# Patient Record
Sex: FEMALE | Race: ASIAN | NOT HISPANIC OR LATINO
[De-identification: names, ages, dates, MRNs, and addresses within clinical notes are randomized per-mention and may not be internally consistent; named-entity substitution may affect disease eponyms.]

---

## 2018-11-18 ENCOUNTER — FORM ENCOUNTER (OUTPATIENT)
Age: 58
End: 2018-11-18

## 2018-11-19 ENCOUNTER — APPOINTMENT (OUTPATIENT)
Dept: ORTHOPEDIC SURGERY | Facility: CLINIC | Age: 58
End: 2018-11-19
Payer: MEDICAID

## 2018-11-19 ENCOUNTER — OUTPATIENT (OUTPATIENT)
Dept: OUTPATIENT SERVICES | Facility: HOSPITAL | Age: 58
LOS: 1 days | End: 2018-11-19
Payer: COMMERCIAL

## 2018-11-19 PROCEDURE — 99213 OFFICE O/P EST LOW 20 MIN: CPT

## 2018-11-19 PROCEDURE — 73562 X-RAY EXAM OF KNEE 3: CPT | Mod: 26,50

## 2018-11-19 PROCEDURE — 73562 X-RAY EXAM OF KNEE 3: CPT

## 2018-12-11 ENCOUNTER — APPOINTMENT (OUTPATIENT)
Dept: ORTHOPEDIC SURGERY | Facility: CLINIC | Age: 58
End: 2018-12-11
Payer: MEDICAID

## 2018-12-11 PROCEDURE — 99213 OFFICE O/P EST LOW 20 MIN: CPT

## 2019-02-12 ENCOUNTER — APPOINTMENT (OUTPATIENT)
Dept: ORTHOPEDIC SURGERY | Facility: CLINIC | Age: 59
End: 2019-02-12
Payer: MEDICAID

## 2019-02-12 DIAGNOSIS — M17.12 UNILATERAL PRIMARY OSTEOARTHRITIS, LEFT KNEE: ICD-10-CM

## 2019-02-12 PROCEDURE — 99213 OFFICE O/P EST LOW 20 MIN: CPT

## 2019-02-12 NOTE — REASON FOR VISIT
[Follow-Up Visit] : a follow-up visit for [Artificial Knee Joint] : artificial knee joint [Knee Pain] : knee pain [Osteoarthritis, Knee] : osteoarthritis, knee

## 2019-03-01 NOTE — PHYSICAL EXAM
[de-identified] : Gen: NAD/AAOx3, cooperative\par HEENT: NC/AT\par CV: RRR\par Resp: nonlabored breathing, equal chest rise\par Abd: soft/nontender/nondistended\par R knee:\par - Incision well healed\par - ROM 0-100\par - Lig stable V/V/A/P\par - Motor 5/5\par - NTTP\par - NVID\par L knee:\par - ROM 0-110\par - Lig stable V/V/A/P\par - Motor 5/5\par - Mildly TTP medial joint line, otherwise NTTP\par - NVID

## 2019-03-01 NOTE — HISTORY OF PRESENT ILLNESS
[de-identified] : 58y/o female accompanied by son following up for R TKA and left knee OA. Last left knee CSI was Nov 2018. She is doing very well. No pain in either knee. Participating in PT twice weekly. Ambulating without assistive device. No complaints.

## 2019-03-01 NOTE — DISCUSSION/SUMMARY
[de-identified] : 60y/o female s/p R TKA, with stable left knee OA\par - Declined CSI today due to no pain\par - Cont PT\par - RTC yearly for new XR, or call back earlier for appointment if needed...

## 2020-12-05 ENCOUNTER — INPATIENT (INPATIENT)
Facility: HOSPITAL | Age: 60
LOS: 3 days | Discharge: HOME HEALTH | End: 2020-12-09
Attending: INTERNAL MEDICINE | Admitting: INTERNAL MEDICINE
Payer: MEDICAID

## 2020-12-05 VITALS
TEMPERATURE: 98 F | SYSTOLIC BLOOD PRESSURE: 156 MMHG | HEART RATE: 74 BPM | DIASTOLIC BLOOD PRESSURE: 75 MMHG | OXYGEN SATURATION: 100 % | RESPIRATION RATE: 16 BRPM

## 2020-12-05 DIAGNOSIS — R33.9 RETENTION OF URINE, UNSPECIFIED: ICD-10-CM

## 2020-12-05 LAB
A1C WITH ESTIMATED AVERAGE GLUCOSE RESULT: 10.8 % — HIGH (ref 4–5.6)
ALBUMIN SERPL ELPH-MCNC: 3.7 G/DL — SIGNIFICANT CHANGE UP (ref 3.3–5)
ALP SERPL-CCNC: 85 U/L — SIGNIFICANT CHANGE UP (ref 40–120)
ALT FLD-CCNC: 22 U/L — SIGNIFICANT CHANGE UP (ref 4–33)
ANION GAP SERPL CALC-SCNC: 14 MMOL/L — SIGNIFICANT CHANGE UP (ref 7–14)
APPEARANCE UR: CLEAR — SIGNIFICANT CHANGE UP
AST SERPL-CCNC: 25 U/L — SIGNIFICANT CHANGE UP (ref 4–32)
B-OH-BUTYR SERPL-SCNC: <0 MMOL/L — SIGNIFICANT CHANGE UP (ref 0–0.4)
BASOPHILS # BLD AUTO: 0.04 K/UL — SIGNIFICANT CHANGE UP (ref 0–0.2)
BASOPHILS NFR BLD AUTO: 0.3 % — SIGNIFICANT CHANGE UP (ref 0–2)
BILIRUB SERPL-MCNC: 0.4 MG/DL — SIGNIFICANT CHANGE UP (ref 0.2–1.2)
BILIRUB UR-MCNC: NEGATIVE — SIGNIFICANT CHANGE UP
BLOOD GAS VENOUS COMPREHENSIVE RESULT: SIGNIFICANT CHANGE UP
BUN SERPL-MCNC: 16 MG/DL — SIGNIFICANT CHANGE UP (ref 7–23)
CALCIUM SERPL-MCNC: 9.8 MG/DL — SIGNIFICANT CHANGE UP (ref 8.4–10.5)
CHLORIDE SERPL-SCNC: 96 MMOL/L — LOW (ref 98–107)
CO2 SERPL-SCNC: 24 MMOL/L — SIGNIFICANT CHANGE UP (ref 22–31)
COLOR SPEC: SIGNIFICANT CHANGE UP
CREAT SERPL-MCNC: 0.79 MG/DL — SIGNIFICANT CHANGE UP (ref 0.5–1.3)
DIFF PNL FLD: NEGATIVE — SIGNIFICANT CHANGE UP
EOSINOPHIL # BLD AUTO: 0.09 K/UL — SIGNIFICANT CHANGE UP (ref 0–0.5)
EOSINOPHIL NFR BLD AUTO: 0.6 % — SIGNIFICANT CHANGE UP (ref 0–6)
ESTIMATED AVERAGE GLUCOSE: 263 — SIGNIFICANT CHANGE UP
GLUCOSE SERPL-MCNC: 281 MG/DL — HIGH (ref 70–99)
GLUCOSE UR QL: ABNORMAL
HCT VFR BLD CALC: 44.6 % — SIGNIFICANT CHANGE UP (ref 34.5–45)
HGB BLD-MCNC: 13.5 G/DL — SIGNIFICANT CHANGE UP (ref 11.5–15.5)
IMM GRANULOCYTES NFR BLD AUTO: 0.4 % — SIGNIFICANT CHANGE UP (ref 0–1.5)
KETONES UR-MCNC: NEGATIVE — SIGNIFICANT CHANGE UP
LEUKOCYTE ESTERASE UR-ACNC: NEGATIVE — SIGNIFICANT CHANGE UP
LYMPHOCYTES # BLD AUTO: 19 % — SIGNIFICANT CHANGE UP (ref 13–44)
LYMPHOCYTES # BLD AUTO: 2.64 K/UL — SIGNIFICANT CHANGE UP (ref 1–3.3)
MCHC RBC-ENTMCNC: 26.6 PG — LOW (ref 27–34)
MCHC RBC-ENTMCNC: 30.3 GM/DL — LOW (ref 32–36)
MCV RBC AUTO: 87.8 FL — SIGNIFICANT CHANGE UP (ref 80–100)
MONOCYTES # BLD AUTO: 1 K/UL — HIGH (ref 0–0.9)
MONOCYTES NFR BLD AUTO: 7.2 % — SIGNIFICANT CHANGE UP (ref 2–14)
NEUTROPHILS # BLD AUTO: 10.09 K/UL — HIGH (ref 1.8–7.4)
NEUTROPHILS NFR BLD AUTO: 72.5 % — SIGNIFICANT CHANGE UP (ref 43–77)
NITRITE UR-MCNC: NEGATIVE — SIGNIFICANT CHANGE UP
NRBC # BLD: 0 /100 WBCS — SIGNIFICANT CHANGE UP
NRBC # FLD: 0 K/UL — SIGNIFICANT CHANGE UP
PH UR: 6 — SIGNIFICANT CHANGE UP (ref 5–8)
PLATELET # BLD AUTO: 327 K/UL — SIGNIFICANT CHANGE UP (ref 150–400)
POTASSIUM SERPL-MCNC: 4.5 MMOL/L — SIGNIFICANT CHANGE UP (ref 3.5–5.3)
POTASSIUM SERPL-SCNC: 4.5 MMOL/L — SIGNIFICANT CHANGE UP (ref 3.5–5.3)
PROT SERPL-MCNC: 8.1 G/DL — SIGNIFICANT CHANGE UP (ref 6–8.3)
PROT UR-MCNC: NEGATIVE — SIGNIFICANT CHANGE UP
RBC # BLD: 5.08 M/UL — SIGNIFICANT CHANGE UP (ref 3.8–5.2)
RBC # FLD: 15 % — HIGH (ref 10.3–14.5)
SARS-COV-2 RNA SPEC QL NAA+PROBE: SIGNIFICANT CHANGE UP
SODIUM SERPL-SCNC: 134 MMOL/L — LOW (ref 135–145)
SP GR SPEC: 1.01 — SIGNIFICANT CHANGE UP (ref 1.01–1.02)
UROBILINOGEN FLD QL: SIGNIFICANT CHANGE UP
WBC # BLD: 13.92 K/UL — HIGH (ref 3.8–10.5)
WBC # FLD AUTO: 13.92 K/UL — HIGH (ref 3.8–10.5)

## 2020-12-05 PROCEDURE — 99285 EMERGENCY DEPT VISIT HI MDM: CPT

## 2020-12-05 RX ORDER — SODIUM CHLORIDE 9 MG/ML
1000 INJECTION INTRAMUSCULAR; INTRAVENOUS; SUBCUTANEOUS ONCE
Refills: 0 | Status: COMPLETED | OUTPATIENT
Start: 2020-12-05 | End: 2020-12-05

## 2020-12-05 RX ADMIN — SODIUM CHLORIDE 2000 MILLILITER(S): 9 INJECTION INTRAMUSCULAR; INTRAVENOUS; SUBCUTANEOUS at 17:08

## 2020-12-05 NOTE — ED ADULT NURSE REASSESSMENT NOTE - NS ED NURSE REASSESS COMMENT FT1
Report received from day shift RN. Pt appears comfortable, in NAD, respirations even/unlabored, pt states "I feel good," VS as noted, FS completed, will give report to floor RN, will continue to monitor.

## 2020-12-05 NOTE — ED PROVIDER NOTE - CLINICAL SUMMARY MEDICAL DECISION MAKING FREE TEXT BOX
Joe: Long-standing DM. Also, no Elavil to help sleep. P/w 3 days frequent, low-volume urine. No dysuria. No F. POCUS PVR ~1,800 cc. Consider UTI, DM neuropathic bladder, or Elavil (anticholinergic) as causes of urinary retention. Place salmeron, check labs, A1c. Admit vs. CDU.

## 2020-12-05 NOTE — ED ADULT TRIAGE NOTE - CHIEF COMPLAINT QUOTE
Pt c/o urinary frequency and excessive thirst x 3 days. Alos has lower abd discomfort. Pt reports UTIs in the past. Denies hematuria, N/V/D, fever/chills, lost of taste or smell. F/s 322 in triage.  Hx: HLD, HTN, DM

## 2020-12-05 NOTE — ED PROVIDER NOTE - CHPI ED SYMPTOMS NEG
no cough, no flank pain, no chest pain, no shortness of breath/no vomiting/no chills/no fever/no nausea

## 2020-12-05 NOTE — ED ADULT NURSE NOTE - OBJECTIVE STATEMENT
pt received to room 25, a&ox 3, ambulatory, pmh of DM, HTN, HLD, p/w excessive thirst and urination x3days. Pt endorses blood sugars at home to be about 200 to 250. Pt reports taking metformin in the morning and insulin every evening. Pt breathing even and unlabored on room air. NSR on cardiac monitor. Denies fever, chills, cough, SOB, chest pain, palpitations, dizziness, N/V/D, constipation, numbness, tingling. Denies painful/burning urination. pending MD hutchinson.

## 2020-12-05 NOTE — ED ADULT NURSE NOTE - NSIMPLEMENTINTERV_GEN_ALL_ED
Implemented All Universal Safety Interventions:  [place] to call system. Call bell, personal items and telephone within reach. Instruct patient to call for assistance. Room bathroom lighting operational. Non-slip footwear when patient is off stretcher. Physically safe environment: no spills, clutter or unnecessary equipment. Stretcher in lowest position, wheels locked, appropriate side rails in place.

## 2020-12-05 NOTE — ED PROVIDER NOTE - OBJECTIVE STATEMENT
59 y/o F w/ PMH DM and HLD, Telugu speaking mostly (Dr. Diaz translating at bedside), presents c/o frequent urination x3days. Pt states she is going to the bathroom every few minutes in small amounts. Pt also c/o low abdominal pressure. Pt takes Amitriptyline for insomnia. States she has never had retention before. Denies any flank pain, fever, chills, cough, chest pain, shortness of breath, nausea or vomiting.

## 2020-12-05 NOTE — ED PROVIDER NOTE - ATTENDING CONTRIBUTION TO CARE
I performed a face-to-face evaluation of the patient and performed a history and physical examination. I agree with the history and physical examination.    Long-standing DM. Also, no Elavil to help sleep. P/w 3 days frequent, low-volume urine. No dysuria. No F. POCUS PVR ~1,800 cc. Consider UTI, DM neuropathic bladder, or Elavil (anticholinergic) as causes of urinary retention. Place salmeron, check labs, A1c. Admit vs. CDU.

## 2020-12-06 ENCOUNTER — TRANSCRIPTION ENCOUNTER (OUTPATIENT)
Age: 60
End: 2020-12-06

## 2020-12-06 DIAGNOSIS — I10 ESSENTIAL (PRIMARY) HYPERTENSION: ICD-10-CM

## 2020-12-06 DIAGNOSIS — E11.69 TYPE 2 DIABETES MELLITUS WITH OTHER SPECIFIED COMPLICATION: ICD-10-CM

## 2020-12-06 DIAGNOSIS — Z29.9 ENCOUNTER FOR PROPHYLACTIC MEASURES, UNSPECIFIED: ICD-10-CM

## 2020-12-06 DIAGNOSIS — R33.9 RETENTION OF URINE, UNSPECIFIED: ICD-10-CM

## 2020-12-06 DIAGNOSIS — N30.90 CYSTITIS, UNSPECIFIED WITHOUT HEMATURIA: ICD-10-CM

## 2020-12-06 DIAGNOSIS — N39.0 URINARY TRACT INFECTION, SITE NOT SPECIFIED: ICD-10-CM

## 2020-12-06 DIAGNOSIS — E11.9 TYPE 2 DIABETES MELLITUS WITHOUT COMPLICATIONS: ICD-10-CM

## 2020-12-06 LAB
ANION GAP SERPL CALC-SCNC: 10 MMOL/L — SIGNIFICANT CHANGE UP (ref 7–14)
BASOPHILS # BLD AUTO: 0.02 K/UL — SIGNIFICANT CHANGE UP (ref 0–0.2)
BASOPHILS NFR BLD AUTO: 0.2 % — SIGNIFICANT CHANGE UP (ref 0–2)
BUN SERPL-MCNC: 12 MG/DL — SIGNIFICANT CHANGE UP (ref 7–23)
CALCIUM SERPL-MCNC: 9.6 MG/DL — SIGNIFICANT CHANGE UP (ref 8.4–10.5)
CHLORIDE SERPL-SCNC: 100 MMOL/L — SIGNIFICANT CHANGE UP (ref 98–107)
CHOLEST SERPL-MCNC: 200 MG/DL — HIGH
CO2 SERPL-SCNC: 28 MMOL/L — SIGNIFICANT CHANGE UP (ref 22–31)
CREAT SERPL-MCNC: 0.72 MG/DL — SIGNIFICANT CHANGE UP (ref 0.5–1.3)
EOSINOPHIL # BLD AUTO: 0.11 K/UL — SIGNIFICANT CHANGE UP (ref 0–0.5)
EOSINOPHIL NFR BLD AUTO: 1 % — SIGNIFICANT CHANGE UP (ref 0–6)
GLUCOSE SERPL-MCNC: 217 MG/DL — HIGH (ref 70–99)
HCT VFR BLD CALC: 42.9 % — SIGNIFICANT CHANGE UP (ref 34.5–45)
HDLC SERPL-MCNC: 45 MG/DL — LOW
HGB BLD-MCNC: 12.7 G/DL — SIGNIFICANT CHANGE UP (ref 11.5–15.5)
IANC: 7.16 K/UL — SIGNIFICANT CHANGE UP (ref 1.5–8.5)
IMM GRANULOCYTES NFR BLD AUTO: 0.4 % — SIGNIFICANT CHANGE UP (ref 0–1.5)
LIPID PNL WITH DIRECT LDL SERPL: 131 MG/DL — HIGH
LYMPHOCYTES # BLD AUTO: 2.81 K/UL — SIGNIFICANT CHANGE UP (ref 1–3.3)
LYMPHOCYTES # BLD AUTO: 25.6 % — SIGNIFICANT CHANGE UP (ref 13–44)
MAGNESIUM SERPL-MCNC: 1.9 MG/DL — SIGNIFICANT CHANGE UP (ref 1.6–2.6)
MCHC RBC-ENTMCNC: 26.6 PG — LOW (ref 27–34)
MCHC RBC-ENTMCNC: 29.6 GM/DL — LOW (ref 32–36)
MCV RBC AUTO: 89.9 FL — SIGNIFICANT CHANGE UP (ref 80–100)
MONOCYTES # BLD AUTO: 0.84 K/UL — SIGNIFICANT CHANGE UP (ref 0–0.9)
MONOCYTES NFR BLD AUTO: 7.7 % — SIGNIFICANT CHANGE UP (ref 2–14)
NEUTROPHILS # BLD AUTO: 7.16 K/UL — SIGNIFICANT CHANGE UP (ref 1.8–7.4)
NEUTROPHILS NFR BLD AUTO: 65.1 % — SIGNIFICANT CHANGE UP (ref 43–77)
NON HDL CHOLESTEROL: 155 MG/DL — HIGH
NRBC # BLD: 0 /100 WBCS — SIGNIFICANT CHANGE UP
NRBC # FLD: 0 K/UL — SIGNIFICANT CHANGE UP
PHOSPHATE SERPL-MCNC: 4.5 MG/DL — SIGNIFICANT CHANGE UP (ref 2.5–4.5)
PLATELET # BLD AUTO: 279 K/UL — SIGNIFICANT CHANGE UP (ref 150–400)
POTASSIUM SERPL-MCNC: 3.2 MMOL/L — LOW (ref 3.5–5.3)
POTASSIUM SERPL-SCNC: 3.2 MMOL/L — LOW (ref 3.5–5.3)
RBC # BLD: 4.77 M/UL — SIGNIFICANT CHANGE UP (ref 3.8–5.2)
RBC # FLD: 15.1 % — HIGH (ref 10.3–14.5)
SODIUM SERPL-SCNC: 138 MMOL/L — SIGNIFICANT CHANGE UP (ref 135–145)
TRIGL SERPL-MCNC: 118 MG/DL — SIGNIFICANT CHANGE UP
WBC # BLD: 10.98 K/UL — HIGH (ref 3.8–10.5)
WBC # FLD AUTO: 10.98 K/UL — HIGH (ref 3.8–10.5)

## 2020-12-06 PROCEDURE — 99223 1ST HOSP IP/OBS HIGH 75: CPT | Mod: GC

## 2020-12-06 PROCEDURE — 12345: CPT | Mod: NC

## 2020-12-06 RX ORDER — NITROFURANTOIN MACROCRYSTAL 50 MG
100 CAPSULE ORAL
Refills: 0 | Status: DISCONTINUED | OUTPATIENT
Start: 2020-12-06 | End: 2020-12-06

## 2020-12-06 RX ORDER — ACETAMINOPHEN 500 MG
1 TABLET ORAL
Qty: 0 | Refills: 0 | DISCHARGE

## 2020-12-06 RX ORDER — DEXTROSE 50 % IN WATER 50 %
12.5 SYRINGE (ML) INTRAVENOUS ONCE
Refills: 0 | Status: DISCONTINUED | OUTPATIENT
Start: 2020-12-06 | End: 2020-12-09

## 2020-12-06 RX ORDER — NITROFURANTOIN MACROCRYSTAL 50 MG
1 CAPSULE ORAL
Qty: 0 | Refills: 0 | DISCHARGE

## 2020-12-06 RX ORDER — INSULIN LISPRO 100/ML
VIAL (ML) SUBCUTANEOUS
Refills: 0 | Status: DISCONTINUED | OUTPATIENT
Start: 2020-12-06 | End: 2020-12-09

## 2020-12-06 RX ORDER — ASPIRIN/CALCIUM CARB/MAGNESIUM 324 MG
1 TABLET ORAL
Qty: 0 | Refills: 0 | DISCHARGE

## 2020-12-06 RX ORDER — INFLUENZA VIRUS VACCINE 15; 15; 15; 15 UG/.5ML; UG/.5ML; UG/.5ML; UG/.5ML
0.5 SUSPENSION INTRAMUSCULAR ONCE
Refills: 0 | Status: COMPLETED | OUTPATIENT
Start: 2020-12-06 | End: 2020-12-06

## 2020-12-06 RX ORDER — NITROFURANTOIN MACROCRYSTAL 50 MG
100 CAPSULE ORAL
Refills: 0 | Status: DISCONTINUED | OUTPATIENT
Start: 2020-12-06 | End: 2020-12-09

## 2020-12-06 RX ORDER — AMLODIPINE BESYLATE 2.5 MG/1
10 TABLET ORAL DAILY
Refills: 0 | Status: DISCONTINUED | OUTPATIENT
Start: 2020-12-06 | End: 2020-12-06

## 2020-12-06 RX ORDER — AMLODIPINE BESYLATE 2.5 MG/1
1 TABLET ORAL
Qty: 0 | Refills: 0 | DISCHARGE

## 2020-12-06 RX ORDER — ASPIRIN/CALCIUM CARB/MAGNESIUM 324 MG
81 TABLET ORAL DAILY
Refills: 0 | Status: DISCONTINUED | OUTPATIENT
Start: 2020-12-06 | End: 2020-12-09

## 2020-12-06 RX ORDER — DEXTROSE 50 % IN WATER 50 %
15 SYRINGE (ML) INTRAVENOUS ONCE
Refills: 0 | Status: DISCONTINUED | OUTPATIENT
Start: 2020-12-06 | End: 2020-12-09

## 2020-12-06 RX ORDER — DEXTROSE 50 % IN WATER 50 %
25 SYRINGE (ML) INTRAVENOUS ONCE
Refills: 0 | Status: DISCONTINUED | OUTPATIENT
Start: 2020-12-06 | End: 2020-12-09

## 2020-12-06 RX ORDER — SODIUM CHLORIDE 9 MG/ML
1000 INJECTION, SOLUTION INTRAVENOUS
Refills: 0 | Status: DISCONTINUED | OUTPATIENT
Start: 2020-12-06 | End: 2020-12-09

## 2020-12-06 RX ORDER — POTASSIUM CHLORIDE 20 MEQ
40 PACKET (EA) ORAL ONCE
Refills: 0 | Status: COMPLETED | OUTPATIENT
Start: 2020-12-06 | End: 2020-12-06

## 2020-12-06 RX ORDER — METOPROLOL TARTRATE 50 MG
25 TABLET ORAL
Refills: 0 | Status: DISCONTINUED | OUTPATIENT
Start: 2020-12-06 | End: 2020-12-06

## 2020-12-06 RX ORDER — OXYBUTYNIN CHLORIDE 5 MG
5 TABLET ORAL
Refills: 0 | Status: DISCONTINUED | OUTPATIENT
Start: 2020-12-06 | End: 2020-12-06

## 2020-12-06 RX ORDER — AMLODIPINE BESYLATE 2.5 MG/1
10 TABLET ORAL DAILY
Refills: 0 | Status: DISCONTINUED | OUTPATIENT
Start: 2020-12-06 | End: 2020-12-09

## 2020-12-06 RX ORDER — LANOLIN ALCOHOL/MO/W.PET/CERES
3 CREAM (GRAM) TOPICAL AT BEDTIME
Refills: 0 | Status: DISCONTINUED | OUTPATIENT
Start: 2020-12-06 | End: 2020-12-09

## 2020-12-06 RX ORDER — GLUCAGON INJECTION, SOLUTION 0.5 MG/.1ML
1 INJECTION, SOLUTION SUBCUTANEOUS ONCE
Refills: 0 | Status: DISCONTINUED | OUTPATIENT
Start: 2020-12-06 | End: 2020-12-09

## 2020-12-06 RX ORDER — INSULIN GLARGINE 100 [IU]/ML
12 INJECTION, SOLUTION SUBCUTANEOUS AT BEDTIME
Refills: 0 | Status: DISCONTINUED | OUTPATIENT
Start: 2020-12-06 | End: 2020-12-07

## 2020-12-06 RX ORDER — METOPROLOL TARTRATE 50 MG
1 TABLET ORAL
Qty: 0 | Refills: 0 | DISCHARGE

## 2020-12-06 RX ORDER — INSULIN LISPRO 100/ML
4 VIAL (ML) SUBCUTANEOUS
Refills: 0 | Status: DISCONTINUED | OUTPATIENT
Start: 2020-12-06 | End: 2020-12-07

## 2020-12-06 RX ORDER — METOPROLOL TARTRATE 50 MG
25 TABLET ORAL
Refills: 0 | Status: DISCONTINUED | OUTPATIENT
Start: 2020-12-06 | End: 2020-12-09

## 2020-12-06 RX ADMIN — Medication 25 MILLIGRAM(S): at 18:23

## 2020-12-06 RX ADMIN — Medication 2: at 13:03

## 2020-12-06 RX ADMIN — Medication 2: at 09:32

## 2020-12-06 RX ADMIN — INSULIN GLARGINE 12 UNIT(S): 100 INJECTION, SOLUTION SUBCUTANEOUS at 22:22

## 2020-12-06 RX ADMIN — Medication 25 MILLIGRAM(S): at 07:38

## 2020-12-06 RX ADMIN — Medication 2: at 22:22

## 2020-12-06 RX ADMIN — Medication 4 UNIT(S): at 18:22

## 2020-12-06 RX ADMIN — AMLODIPINE BESYLATE 10 MILLIGRAM(S): 2.5 TABLET ORAL at 07:38

## 2020-12-06 RX ADMIN — Medication 4: at 18:22

## 2020-12-06 RX ADMIN — Medication 81 MILLIGRAM(S): at 13:03

## 2020-12-06 RX ADMIN — Medication 4 UNIT(S): at 13:04

## 2020-12-06 RX ADMIN — Medication 3 MILLIGRAM(S): at 22:22

## 2020-12-06 RX ADMIN — Medication 40 MILLIEQUIVALENT(S): at 07:38

## 2020-12-06 RX ADMIN — Medication 100 MILLIGRAM(S): at 07:38

## 2020-12-06 RX ADMIN — Medication 100 MILLIGRAM(S): at 18:23

## 2020-12-06 NOTE — H&P ADULT - NSHPPHYSICALEXAM_GEN_ALL_CORE
Vital Signs Last 24 Hrs  T(C): 36.7 (06 Dec 2020 00:30), Max: 36.8 (05 Dec 2020 21:25)  T(F): 98.1 (06 Dec 2020 00:30), Max: 98.2 (05 Dec 2020 21:25)  HR: 80 (06 Dec 2020 00:30) (66 - 84)  BP: 139/79 (06 Dec 2020 00:30) (109/58 - 175/85)  BP(mean): --  RR: 18 (06 Dec 2020 00:30) (16 - 18)  SpO2: 100% (06 Dec 2020 00:30) (99% - 100%)    GENERAL: NAD, lying in bed comfortably  HEAD:  Atraumatic, Normocephalic  EYES: EOMI, PERRLA, conjunctiva and sclera clear  ENT: Moist mucous membranes  NECK: Supple, No JVD  CHEST/LUNG: Clear to auscultation bilaterally; No rales, rhonchi, wheezing, or rubs. Unlabored respirations  HEART: Regular rate and rhythm; No murmurs, rubs, or gallops  ABDOMEN: Bowel sounds present; Soft, Nontender, Nondistended.   : salmeron present with clear urine   EXTREMITIES:  2+ Peripheral Pulses, brisk capillary refill. No clubbing, cyanosis, or edema  NERVOUS SYSTEM:  Alert & Oriented X3, speech clear. No deficits   MSK: FROM all 4 extremities, full and equal strength  PSYCH: normal affect  SKIN: No rashes or lesions Vital Signs Last 24 Hrs  T(C): 36.7 (06 Dec 2020 00:30), Max: 36.8 (05 Dec 2020 21:25)  T(F): 98.1 (06 Dec 2020 00:30), Max: 98.2 (05 Dec 2020 21:25)  HR: 80 (06 Dec 2020 00:30) (66 - 84)  BP: 139/79 (06 Dec 2020 00:30) (109/58 - 175/85)  BP(mean): --  RR: 18 (06 Dec 2020 00:30) (16 - 18)  SpO2: 100% (06 Dec 2020 00:30) (99% - 100%)    Constitutional: NAD, well-developed, well-nourished  Ears, Nose, Mouth, and Throat: normal external ears and nose, normal hearing, moist oral mucosa  Eyes: normal conjunctiva, EOMI, PERRL  Neck: supple, no JVD  Respiratory: Clear to auscultation bilaterally. No wheezes, rales or rhonchi. Normal respiratory effort  Cardiovascular: RRR, no M/R/G, no edema, 2+ Peripheral Pulses  Gastrointestinal: soft, nontender, nondistended, +BS, no hernia  : salmeron present with clear urine   Skin: warm, dry, no rash  Neurologic: sensation grossly intact, CN grossly intact, non-focal exam  Musculoskeletal: no clubbing, no cyanosis, no joint swelling  Psychiatric: AOX3, appropriate mood, affect

## 2020-12-06 NOTE — PROGRESS NOTE ADULT - PROBLEM SELECTOR PLAN 3
although present with leukocytosis, UA negative  -patient has prescription for nutrofurantoin   -will follow up with PCP on Monday to see if urine was tested  -c/w nitrofurantoin

## 2020-12-06 NOTE — H&P ADULT - HISTORY OF PRESENT ILLNESS
60 y.o F (Lithuanian speaking, used  ID# [id number]) with PMH of type 2 DM (not on insulin), HTN, insomnia (on amytriptiline) presents with difficulty urinating/polyuria x3 days. Patient originally went to PCP, and was recently prescribed macrobid for presumed UTI (unclear if UA was checked). However, patient's symptoms did not improve, and was associated with abdominal discomfort. In the ED UA was negative for infection, but a POCUS of the bladder revealed  approimately 1.8L present in the bladder. The patient then had a salmeron placed in the ED with drainage of appx 1.2L of clear urine. The patient's abdominal pain greatly improved post drainage.    The patient denies any trauma to the back/head. The patient stated she has had problems with urinary retention starting 2-3years ago. The patient states she has had foleys placed in the past to drain her urine. She cannot recall the particular reason why she has had urinary retention. The patient is unsure if she saw a urologist or urogynecologist.

## 2020-12-06 NOTE — H&P ADULT - PROBLEM SELECTOR PLAN 5
ppx: improve score of 0  diet: CC/DASH/TLC  dispo: admission for urinary retention -c/w amlodipine and metoprolol tartrate  -also on ASA, will need to follow up with PCP for indication

## 2020-12-06 NOTE — DISCHARGE NOTE PROVIDER - NSDCMRMEDTOKEN_GEN_ALL_CORE_FT
amitriptyline 10 mg oral tablet: 1 tab(s) orally once a day (at bedtime)  amLODIPine 10 mg oral tablet: 1 tab(s) orally once a day  aspirin 81 mg oral tablet: 1 tab(s) orally once a day  Januvia 100 mg oral tablet: 1 tab(s) orally once a day  metFORMIN 1000 mg oral tablet: 1 tab(s) orally 2 times a day  Metoprolol Tartrate 25 mg oral tablet: 1 tab(s) orally 2 times a day  nitrofurantoin macrocrystals-monohydrate 100 mg oral capsule: 1 cap(s) orally once a day (at bedtime)  oxybutynin 5 mg oral tablet: 1 tab(s) orally 2 times a day  Tylenol Extra Strength 500 mg oral tablet: 1 tab(s) orally every 6 hours, As Needed   amLODIPine 10 mg oral tablet: 1 tab(s) orally once a day  aspirin 81 mg oral tablet: 1 tab(s) orally once a day  Januvia 100 mg oral tablet: 1 tab(s) orally once a day  melatonin 3 mg oral tablet: 1 tab(s) orally once a day (at bedtime)  metFORMIN 1000 mg oral tablet: 1 tab(s) orally 2 times a day  Metoprolol Tartrate 25 mg oral tablet: 1 tab(s) orally 2 times a day  nitrofurantoin macrocrystals-monohydrate 100 mg oral capsule: 1 cap(s) orally once a day (at bedtime)  Tylenol Extra Strength 500 mg oral tablet: 1 tab(s) orally every 6 hours, As Needed   alcohol swabs : Apply topically to affected area 4 times a day   amLODIPine 10 mg oral tablet: 1 tab(s) orally once a day  aspirin 81 mg oral tablet: 1 tab(s) orally once a day  glucometer (per patient&#x27;s insurance): Test blood sugars four times a day. Dispense #1 glucometer.  Insulin Lispro KwikPen 100 units/mL injectable solution: 8 unit(s) injectable 3 times a day (before meals)   lancets: 1 application subcutaneously 4 times a day   Lantus Solostar Pen 100 units/mL subcutaneous solution: 18 unit(s) subcutaneous once a day (at bedtime)   melatonin 3 mg oral tablet: 1 tab(s) orally once a day (at bedtime)  Metoprolol Tartrate 25 mg oral tablet: 1 tab(s) orally 2 times a day  nitrofurantoin macrocrystals-monohydrate 100 mg oral capsule: 1 cap(s) orally once a day (at bedtime)  test strips (per patient&#x27;s insurance): 1 application subcutaneously 4 times a day. ** Compatible with patient&#x27;s glucometer **  Tylenol Extra Strength 500 mg oral tablet: 1 tab(s) orally every 6 hours, As Needed   alcohol swabs : Apply topically to affected area 4 times a day   amLODIPine 10 mg oral tablet: 1 tab(s) orally once a day  aspirin 81 mg oral tablet: 1 tab(s) orally once a day  glucometer (per patient&#x27;s insurance): Test blood sugars four times a day. Dispense #1 glucometer.  Insulin Lispro KwikPen 100 units/mL injectable solution: 8 unit(s) injectable 3 times a day (before meals)   Insulin Pen Needles, 4mm: 1 application subcutaneously 4 times a day. ** Use with insulin pen **   lancets: 1 application subcutaneously 4 times a day   Lantus Solostar Pen 100 units/mL subcutaneous solution: 18 unit(s) subcutaneous once a day (at bedtime)   melatonin 3 mg oral tablet: 1 tab(s) orally once a day (at bedtime)  Metoprolol Tartrate 25 mg oral tablet: 1 tab(s) orally 2 times a day  nitrofurantoin macrocrystals-monohydrate 100 mg oral capsule: 1 cap(s) orally once a day (at bedtime)  test strips (per patient&#x27;s insurance): 1 application subcutaneously 4 times a day. ** Compatible with patient&#x27;s glucometer **  Tylenol Extra Strength 500 mg oral tablet: 1 tab(s) orally every 6 hours, As Needed

## 2020-12-06 NOTE — H&P ADULT - NSHPSOCIALHISTORY_GEN_ALL_CORE
Lives with   Works at home   helps patient with daily tasks  Walks with cane, able to bathe herself with chair   Denies smoking/EtOH

## 2020-12-06 NOTE — PROGRESS NOTE ADULT - PROBLEM SELECTOR PLAN 1
Likely anti-cholinergic effect from TCA, oxybutynin, in the setting of underlying DM  -s/p salmeron placement in the ED with drainage of >1.2L  -pt ok with d/c with salmeron and removal by urology as outpatient but will attempt TOV tomorrow   -will reach to urologist on Monday  -will hold amitriptiline and oxybutynin  -will find alternative for insomnia, will try melatonin here for now - patient says it works for her so far  -will hold any other medications with significant anti-cholinergic effects  -potentially related to diabetes, will check A1c - 10.8  -has been an issue in the past for the patient, will follow up with PCP on Monday to see if patient has been seen by a specialist for this problem

## 2020-12-06 NOTE — H&P ADULT - ASSESSMENT
60 y.o F (Italian speaking, used  ID# [id number]) with PMH of type 2 DM (not on insulin), HTN, insomnia (on amitriptyline presents with difficulty urinating/polyuria x3 days found to have significant urinary retention

## 2020-12-06 NOTE — H&P ADULT - PROBLEM SELECTOR PLAN 4
-c/w amlodipine and metoprolol tartrate  -also on ASA, will need to follow up with PCP for indication

## 2020-12-06 NOTE — H&P ADULT - PROBLEM SELECTOR PROBLEM 2
Type 2 diabetes mellitus without complication, without long-term current use of insulin Diabetes mellitus type 2 in obese

## 2020-12-06 NOTE — DISCHARGE NOTE PROVIDER - HOSPITAL COURSE
60 y.o F with PMH of type 2 DM (not on insulin), HTN, insomnia (on amytriptiline) presents with difficulty urinating/polyuria x3 days. Patient originally went to PCP, and was recently prescribed macrobid for presumed UTI (unclear if UA was checked). However, patient's symptoms did not improve, and was associated with abdominal discomfort. In the ED UA was negative for infection, but a POCUS of the bladder revealed  approximately 1.8L present in the bladder. The patient then had a salmeron placed in the ED with drainage of appx 1.2L of clear urine. The patient's abdominal pain greatly improved post drainage.    The patient denies any trauma to the back/head. The patient stated she has had problems with urinary retention starting 2-3years ago. The patient states she has had foleys placed in the past to drain her urine. She cannot recall the particular reason why she has had urinary retention. The patient is unsure if she saw a urologist or urogynecologist. (06 Dec 2020 01:34)    Patient was admitted to medicine with a salmeron and her oxybutynin and amitriptyline were held. She was able to sleep ok with melatonin at night and was agreeable to using it in the future. She preferred to keep the salmeron in as she had experience with requiring one for 2 weeks and remembered the pain of repeatedly needing straight catheterization. Patient opted to go home with salmeron and TOV by urologist in the office.     60 y.o F with PMH of type 2 DM (not on insulin), HTN, insomnia (on amytriptiline) presents with difficulty urinating/polyuria x3 days. Patient originally went to PCP, and was recently prescribed macrobid for presumed UTI (unclear if UA was checked). However, patient's symptoms did not improve, and was associated with abdominal discomfort. In the ED UA was negative for infection, but a POCUS of the bladder revealed  approximately 1.8L present in the bladder. The patient then had a salmeron placed in the ED with drainage of appx 1.2L of clear urine. The patient's abdominal pain greatly improved post drainage.    The patient denies any trauma to the back/head. The patient stated she has had problems with urinary retention starting 2-3years ago. The patient states she has had foleys placed in the past to drain her urine. She cannot recall the particular reason why she has had urinary retention. The patient is unsure if she saw a urologist or urogynecologist. (06 Dec 2020 01:34)    Patient was admitted to medicine with a salmeron and her oxybutynin and amitriptyline were held. She was able to sleep ok with melatonin at night and was agreeable to using it in the future. TOV was performed night of 12/7 and patient was able to urinate on her own. Pt will follow-up with urologist and PCP post-discharge.    Sugars were not controlled in-house. Pt started on Lantus 15u and Lispro 5u before meals with good effect.     60 y.o F with PMH of type 2 DM (not on insulin), HTN, insomnia (on amytriptiline) presents with difficulty urinating/polyuria x3 days. Patient originally went to PCP, and was recently prescribed macrobid for presumed UTI (unclear if UA was checked). However, patient's symptoms did not improve, and was associated with abdominal discomfort. In the ED UA was negative for infection, but a POCUS of the bladder revealed  approximately 1.8L present in the bladder. The patient then had a salmreon placed in the ED with drainage of appx 1.2L of clear urine. The patient's abdominal pain greatly improved post drainage.    The patient denies any trauma to the back/head. The patient stated she has had problems with urinary retention starting 2-3years ago. The patient states she has had foleys placed in the past to drain her urine. She cannot recall the particular reason why she has had urinary retention. The patient is unsure if she saw a urologist or urogynecologist. (06 Dec 2020 01:34)    Patient was admitted to medicine with a salmeron and her oxybutynin and amitriptyline were held. She was able to sleep ok with melatonin at night and was agreeable to using it in the future. TOV was performed night of 12/7 and patient was able to urinate on her own. Pt will follow-up with urologist and PCP post-discharge.    Sugars were not controlled in-house. Pt's insulin was titrated to Lantus 18 units and lispro 8 u TID with meals.     60 y.o F with PMH of type 2 DM (not on insulin), HTN, insomnia (on amytriptiline) presents with difficulty urinating/polyuria x3 days. Patient originally went to PCP, and was recently prescribed macrobid for presumed UTI (unclear if UA was checked). However, patient's symptoms did not improve, and was associated with abdominal discomfort. In the ED UA was negative for infection, but a POCUS of the bladder revealed  approximately 1.8L present in the bladder. The patient then had a salmeron placed in the ED with drainage of appx 1.2L of clear urine. The patient's abdominal pain greatly improved post drainage.    The patient denies any trauma to the back/head. The patient stated she has had problems with urinary retention starting 2-3years ago. The patient states she has had foleys placed in the past to drain her urine. She cannot recall the particular reason why she has had urinary retention. The patient is unsure if she saw a urologist or urogynecologist. (06 Dec 2020 01:34)    Patient was admitted to medicine with a salmeron and her oxybutynin and amitriptyline were held. She was able to sleep ok with melatonin at night and was agreeable to using it in the future. TOV was performed night of 12/7 and patient was able to urinate on her own. Pt will follow-up with urologist and PCP post-discharge.    Sugars were not controlled in-house. Pt's insulin was titrated to Lantus 18 units and lispro 8 u TID with meals with greater control.

## 2020-12-06 NOTE — PROGRESS NOTE ADULT - ASSESSMENT
60 y.o F (Luxembourgish speaking, used  ID# [id number]) with PMH of type 2 DM (not on insulin), HTN, insomnia (on amitriptyline presents with difficulty urinating/polyuria x3 days found to have significant urinary retention now with salmeron catheter

## 2020-12-06 NOTE — PATIENT PROFILE ADULT - OVER THE PAST TWO WEEKS, HAVE YOU FELT LITTLE INTEREST OR PLEASURE IN DOING THINGS?
[Initial Visit] : an initial visit for [No Fault] : This visit is related to no fault  [FreeTextEntry2] : low back pain  no

## 2020-12-06 NOTE — DISCHARGE NOTE PROVIDER - CARE PROVIDER_API CALL
SULTAN LEVY  [id number]  [address] [place] [address]  Phone: [phone]  Fax: ([phone]  Established Patient  Follow Up Time: 1 week

## 2020-12-06 NOTE — H&P ADULT - PROBLEM SELECTOR PLAN 3
although present with leukocytosis, UA negative  -patient has prescription for nutrofurantoin   -will follow up with PCP in AM to see if urine was tested   -c/w nitrofurantoin for now although present with leukocytosis, UA negative  -patient has prescription for nutrofurantoin   -will follow up with PCP in AM to see if urine was tested   -c/w nitrofurantoin although present with leukocytosis, UA negative  -patient has prescription for nutrofurantoin   -will follow up with PCP in AM to see if urine was tested  -c/w nitrofurantoin

## 2020-12-06 NOTE — DISCHARGE NOTE PROVIDER - NSDCCPCAREPLAN_GEN_ALL_CORE_FT
PRINCIPAL DISCHARGE DIAGNOSIS  Diagnosis: Urinary retention  Assessment and Plan of Treatment: You came to the hospital because you were unable to urinate. This was likely because you were taking medications that made it difficult to urinate (amitryptiline and oxybutinin). Your uncontrolled diabetes likely also contributed significantly to this as well. We stopped the medications and also started you on insulin while in the hospital. You had a Mcdermott catheter in your bladder to drain the urine, which was then able to be removed. You should follow-up with your primary care doctor and a urologist within 1 week of discharge to monitor this problem.      SECONDARY DISCHARGE DIAGNOSES  Diagnosis: Diabetes  Assessment and Plan of Treatment: You have uncontrolled diabetes (A1c 10.8). You were taking metformin and Januvia outside the hospital. We stopped those medications and started you on insulin. Your insulins are: Lantus (15 units at bedtime) and Lispro (5 units 3 times a day taken right before meals). You received education on insulin administration prior to discharge. You should follow-up with endocrinology (diabetes doctor) within 1 week of discharge to monitor your sugar and insulin.

## 2020-12-06 NOTE — H&P ADULT - NSHPREVIEWOFSYSTEMS_GEN_ALL_CORE
REVIEW OF SYSTEMS:    CONSTITUTIONAL: No weakness, fevers or chills  EYES/ENT: No visual changes;  No vertigo or throat pain   NECK: No pain or stiffness  RESPIRATORY: No cough, wheezing, hemoptysis; No shortness of breath  CARDIOVASCULAR: No chest pain or palpitations  GASTROINTESTINAL: No abdominal or epigastric pain. No nausea, vomiting, or hematemesis; No diarrhea or constipation. No melena or hematochezia.  GENITOURINARY: No dysuria, frequency or hematuria  NEUROLOGICAL: Endorses occasional burning sensation in b/l elbows/wrists.   PSYCH: denies anxiety/depression  SKIN: No itching, burning, rashes, or lesions   All other review of systems is negative unless indicated above. Constitutional Symptoms: No weakness, fevers, chills, weight loss  Eyes: No visual changes, headache, eye pain, double vision  Ears, Nose, Mouth, Throat: No runny nose, sinus pain, ear pain, tinnitus, sore throat, dysphagia, odynophagia  Cardiovascular: No chest pain, palpitations, edema  Respiratory: No cough, wheezing, hemoptysis, shortness of breath  Gastrointestinal: +abdominal discomfort, no dysphagia, anorexia, nausea/vomiting, diarrhea/constipation, hematemesis, BRBPR, melena  Genitourinary: +difficulty urinating, increased frequency, no dysuria, hematuria  Musculoskeletal: No joint pain, joint swelling, decreased ROM  Skin: No pruritus, rashes, lesions, wounds  Neurologic:  No seizures, headache, paraesthesia, numbness, limb weakness    Positives and pertinent negatives noted and all other systems negative.

## 2020-12-06 NOTE — DISCHARGE NOTE PROVIDER - NSFOLLOWUPCLINICS_GEN_ALL_ED_FT
[facility]  Urology  [address] [place] [id number]  Phone: [phone]  Fax:   Follow Up Time: 1 week    [facility]  Endocrinology  [address] [place] [id number]  Phone: [phone]  Fax:   Follow Up Time: 1 week

## 2020-12-06 NOTE — PROGRESS NOTE ADULT - PROBLEM SELECTOR PLAN 5
ppx: improve score of 0 - encourage ambulation  diet: CC/DASH/TLC  dispo: admission for urinary retention

## 2020-12-06 NOTE — PROGRESS NOTE ADULT - SUBJECTIVE AND OBJECTIVE BOX
*******************************************************  Kali Oneill MD PGY-1  Internal Medicine  Pager ([facility]) [phone] / [phone]  *******************************************************  Patient is a 60y old  Female who presents with a chief complaint of Urinary Rentention (06 Dec 2020 01:34)      PI Gunjan): #[id number]    SUBJECTIVE / OVERNIGHT EVENTS:  - No acute events overnight.   - Patient seen and evaluated at bedside.  - Feeling much better with salmeron.   - Denies fevers/chills, headache, SOB at rest, cough, chest pain, palpitations, abdominal pain, nausea/vomiting/diarrhea/constipation, melena/hematochezia, dysuria, and hematuria    ------------------------------------------------------------------------------------------------------------    MEDICATIONS  (STANDING):  amLODIPine   Tablet 10 milliGRAM(s) Oral daily  aspirin  chewable 81 milliGRAM(s) Oral daily  dextrose 40% Gel 15 Gram(s) Oral once  dextrose 5%. 1000 milliLiter(s) (50 mL/Hr) IV Continuous <Continuous>  dextrose 5%. 1000 milliLiter(s) (100 mL/Hr) IV Continuous <Continuous>  dextrose 50% Injectable 25 Gram(s) IV Push once  dextrose 50% Injectable 12.5 Gram(s) IV Push once  dextrose 50% Injectable 25 Gram(s) IV Push once  glucagon  Injectable 1 milliGRAM(s) IntraMuscular once  influenza   Vaccine 0.5 milliLiter(s) IntraMuscular once  insulin glargine Injectable (LANTUS) 12 Unit(s) SubCutaneous at bedtime  insulin lispro (ADMELOG) corrective regimen sliding scale   SubCutaneous Before meals and at bedtime  insulin lispro Injectable (ADMELOG) 4 Unit(s) SubCutaneous three times a day before meals  metoprolol tartrate 25 milliGRAM(s) Oral two times a day  nitrofurantoin monohydrate/macrocrystals (MACROBID) 100 milliGRAM(s) Oral two times a day    MEDICATIONS  (PRN):      ------------------------------------------------------------------------------------------------------------    OBJECTIVE:    CAPILLARY BLOOD GLUCOSE      POCT Blood Glucose.: 240 mg/dL (06 Dec 2020 12:40)  POCT Blood Glucose.: 210 mg/dL (06 Dec 2020 09:10)  POCT Blood Glucose.: 224 mg/dL (05 Dec 2020 22:24)  POCT Blood Glucose.: 322 mg/dL (05 Dec 2020 14:58)    I&O's Summary    05 Dec 2020 07:01  -  06 Dec 2020 07:00  --------------------------------------------------------  IN: 0 mL / OUT: 4050 mL / NET: -4050 mL      Daily Height in cm: 152.4 (06 Dec 2020 00:30)    Daily     PHYSICAL EXAM:  T(F): 98.1, Max: 98.2 (20 @ 21:25)  HR: 90 (66 - 90)  BP: 119/62 (109/58 - 175/85)  RR: 19 (16 - 19)  SpO2: 97% (97% - 100%)      Constitutional: NAD, well-developed, well-nourished  Ears, Nose, Mouth, and Throat: normal external ears and nose, normal hearing, moist oral mucosa  Eyes: normal conjunctiva, EOMI, PERRL  Neck: supple, no JVD  Respiratory: Clear to auscultation bilaterally. No wheezes, rales or rhonchi. Normal respiratory effort  Cardiovascular: RRR, no M/R/G, no edema, 2+ Peripheral Pulses  Gastrointestinal: soft, nontender, nondistended, +BS, no hernia  : salmeron present with clear yellow urine   Skin: warm, dry, no rash  Neurologic: sensation grossly intact, CN grossly intact, non-focal exam  Musculoskeletal: no clubbing, no cyanosis, no joint swelling  Psychiatric: AOX3, appropriate mood, affect    ------------------------------------------------------------------------------------------------------------  LABS:                        12.7   10.98 )-----------( 279      ( 06 Dec 2020 04:32 )             42.9     12-06    138  |  100  |  12  ----------------------------<  217<H>  3.2<L>   |  28  |  0.72    Ca    9.6      06 Dec 2020 04:32  Phos  4.5     12-  Mg     1.9     12-    TPro  8.1  /  Alb  3.7  /  TBili  0.4  /  DBili  x   /  AST  25  /  ALT  22  /  AlkPhos  85  12-05          Urinalysis Basic - ( 05 Dec 2020 19:14 )    Color: Light Yellow / Appearance: Clear / S.010 / pH: x  Gluc: x / Ketone: Negative  / Bili: Negative / Urobili: <2 mg/dL   Blood: x / Protein: Negative / Nitrite: Negative   Leuk Esterase: Negative / RBC: x / WBC x   Sq Epi: x / Non Sq Epi: x / Bacteria: x          RADIOLOGY & ADDITIONAL TESTS:  Results Reviewed:     Imaging Personally Reviewed:  Electrocardiogram Personally Reviewed:      COORDINATION OF CARE:  Care discussed with consultants/other providers and notes reviewed [Y]/[N]:   Prior or Outpatient Records Reviewed [Y]/[N]:   ------------------------------------------------------------------------------------------------------------

## 2020-12-06 NOTE — PROGRESS NOTE ADULT - ATTENDING COMMENTS
Fang Raygoza MD  Medicine Attending  Cell: [phone]    I have personally seen and examined patient. I discussed the case with Dr. Oneill and agree with findings and plan as detailed per note above. 59 y/o F with hx of DM2, HTN and perhaps stress incontinence presents with urinary retention. Pt went to PCP and was diagnosed with UTI, started on macrobid. In speaking with Pt, she uses amitriptyline sporadically and for insomnia-has not used this x 1 week. With regards to her oxybutynin, she has been on this medication for months but in the last 1-2 months, increased dose to BID. She has seen a doctor who has managed her salmeron as an outpatient and was able to give her a voiding trial as outpatient.    -stop amitriptyline as per admitting team. good effect with melatonin  -stop oxybutynin as well.  -keep salmeron in today, voiding trial in AM. hopefully will void and then will need f/u as outpatient with urology. if no void, would discharge with salmeron and outpatient f/u with urology

## 2020-12-06 NOTE — H&P ADULT - PROBLEM SELECTOR PLAN 1
Likely anti-cholinergic effect from TCA  -s/p salmeron placement in the ED with drainage of >1.2L  -TOV in AM  -will hold amitriptiline  -will find alternative for insomnia, will try melatonin here for now  -will hold any other medications with significant anti-cholinergic effects  -potentially related to diabetes, will check A1c  -has been an issue in the past for the patient, will follow up with PCP in the AM to see if patient has been seen by a specialist for this problem  -c/w oxybutynin Likely anti-cholinergic effect from TCA  -s/p salmeron placement in the ED with drainage of >1.2L  -TOV in AM  -will hold amitriptiline  -will find alternative for insomnia, will try melatonin here for now  -will hold any other medications with significant anti-cholinergic effects  -potentially related to diabetes, will check A1c  -has been an issue in the past for the patient, will follow up with PCP in the AM to see if patient has been seen by a specialist for this problem  -hold oxybutinin Likely anti-cholinergic effect from TCA, oxybutynin, in the setting of underlying DM  -s/p salmeron placement in the ED with drainage of >1.2L  -TOV in AM  -will hold amitriptiline and oxybutynin  -will find alternative for insomnia, will try melatonin here for now  -will hold any other medications with significant anti-cholinergic effects  -potentially related to diabetes, will check A1c  -has been an issue in the past for the patient, will follow up with PCP in the AM to see if patient has been seen by a specialist for this problem

## 2020-12-07 LAB
ANION GAP SERPL CALC-SCNC: 11 MMOL/L — SIGNIFICANT CHANGE UP (ref 7–14)
BASOPHILS # BLD AUTO: 0.02 K/UL — SIGNIFICANT CHANGE UP (ref 0–0.2)
BASOPHILS NFR BLD AUTO: 0.2 % — SIGNIFICANT CHANGE UP (ref 0–2)
BLOOD GAS VENOUS COMPREHENSIVE RESULT: SIGNIFICANT CHANGE UP
BUN SERPL-MCNC: 13 MG/DL — SIGNIFICANT CHANGE UP (ref 7–23)
CALCIUM SERPL-MCNC: 9.6 MG/DL — SIGNIFICANT CHANGE UP (ref 8.4–10.5)
CHLORIDE SERPL-SCNC: 102 MMOL/L — SIGNIFICANT CHANGE UP (ref 98–107)
CO2 SERPL-SCNC: 25 MMOL/L — SIGNIFICANT CHANGE UP (ref 22–31)
CREAT SERPL-MCNC: 0.65 MG/DL — SIGNIFICANT CHANGE UP (ref 0.5–1.3)
EOSINOPHIL # BLD AUTO: 0.22 K/UL — SIGNIFICANT CHANGE UP (ref 0–0.5)
EOSINOPHIL NFR BLD AUTO: 2.2 % — SIGNIFICANT CHANGE UP (ref 0–6)
GLUCOSE BLDC GLUCOMTR-MCNC: 281 MG/DL — HIGH (ref 70–99)
GLUCOSE BLDC GLUCOMTR-MCNC: 299 MG/DL — HIGH (ref 70–99)
GLUCOSE BLDC GLUCOMTR-MCNC: 341 MG/DL — HIGH (ref 70–99)
GLUCOSE SERPL-MCNC: 190 MG/DL — HIGH (ref 70–99)
HCT VFR BLD CALC: 39.9 % — SIGNIFICANT CHANGE UP (ref 34.5–45)
HCV AB S/CO SERPL IA: 0.11 S/CO — SIGNIFICANT CHANGE UP (ref 0–0.99)
HCV AB SERPL-IMP: SIGNIFICANT CHANGE UP
HGB BLD-MCNC: 12.2 G/DL — SIGNIFICANT CHANGE UP (ref 11.5–15.5)
IANC: 6.34 K/UL — SIGNIFICANT CHANGE UP (ref 1.5–8.5)
IMM GRANULOCYTES NFR BLD AUTO: 0.4 % — SIGNIFICANT CHANGE UP (ref 0–1.5)
LYMPHOCYTES # BLD AUTO: 2.6 K/UL — SIGNIFICANT CHANGE UP (ref 1–3.3)
LYMPHOCYTES # BLD AUTO: 26.2 % — SIGNIFICANT CHANGE UP (ref 13–44)
MAGNESIUM SERPL-MCNC: 1.7 MG/DL — SIGNIFICANT CHANGE UP (ref 1.6–2.6)
MCHC RBC-ENTMCNC: 27.2 PG — SIGNIFICANT CHANGE UP (ref 27–34)
MCHC RBC-ENTMCNC: 30.6 GM/DL — LOW (ref 32–36)
MCV RBC AUTO: 88.9 FL — SIGNIFICANT CHANGE UP (ref 80–100)
MONOCYTES # BLD AUTO: 0.72 K/UL — SIGNIFICANT CHANGE UP (ref 0–0.9)
MONOCYTES NFR BLD AUTO: 7.2 % — SIGNIFICANT CHANGE UP (ref 2–14)
NEUTROPHILS # BLD AUTO: 6.34 K/UL — SIGNIFICANT CHANGE UP (ref 1.8–7.4)
NEUTROPHILS NFR BLD AUTO: 63.8 % — SIGNIFICANT CHANGE UP (ref 43–77)
NRBC # BLD: 0 /100 WBCS — SIGNIFICANT CHANGE UP
NRBC # FLD: 0 K/UL — SIGNIFICANT CHANGE UP
PHOSPHATE SERPL-MCNC: 3.6 MG/DL — SIGNIFICANT CHANGE UP (ref 2.5–4.5)
PLATELET # BLD AUTO: 249 K/UL — SIGNIFICANT CHANGE UP (ref 150–400)
POTASSIUM SERPL-MCNC: 3.9 MMOL/L — SIGNIFICANT CHANGE UP (ref 3.5–5.3)
POTASSIUM SERPL-SCNC: 3.9 MMOL/L — SIGNIFICANT CHANGE UP (ref 3.5–5.3)
RBC # BLD: 4.49 M/UL — SIGNIFICANT CHANGE UP (ref 3.8–5.2)
RBC # FLD: 14.9 % — HIGH (ref 10.3–14.5)
SODIUM SERPL-SCNC: 138 MMOL/L — SIGNIFICANT CHANGE UP (ref 135–145)
WBC # BLD: 9.94 K/UL — SIGNIFICANT CHANGE UP (ref 3.8–10.5)
WBC # FLD AUTO: 9.94 K/UL — SIGNIFICANT CHANGE UP (ref 3.8–10.5)

## 2020-12-07 PROCEDURE — 99233 SBSQ HOSP IP/OBS HIGH 50: CPT

## 2020-12-07 RX ORDER — INSULIN GLARGINE 100 [IU]/ML
15 INJECTION, SOLUTION SUBCUTANEOUS AT BEDTIME
Refills: 0 | Status: DISCONTINUED | OUTPATIENT
Start: 2020-12-07 | End: 2020-12-08

## 2020-12-07 RX ORDER — INSULIN LISPRO 100/ML
5 VIAL (ML) SUBCUTANEOUS
Refills: 0 | Status: DISCONTINUED | OUTPATIENT
Start: 2020-12-07 | End: 2020-12-08

## 2020-12-07 RX ADMIN — Medication 4 UNIT(S): at 09:18

## 2020-12-07 RX ADMIN — AMLODIPINE BESYLATE 10 MILLIGRAM(S): 2.5 TABLET ORAL at 05:29

## 2020-12-07 RX ADMIN — Medication 3 MILLIGRAM(S): at 22:55

## 2020-12-07 RX ADMIN — Medication 81 MILLIGRAM(S): at 12:04

## 2020-12-07 RX ADMIN — Medication 25 MILLIGRAM(S): at 17:50

## 2020-12-07 RX ADMIN — Medication 3: at 22:56

## 2020-12-07 RX ADMIN — INSULIN GLARGINE 15 UNIT(S): 100 INJECTION, SOLUTION SUBCUTANEOUS at 22:56

## 2020-12-07 RX ADMIN — Medication 3: at 13:09

## 2020-12-07 RX ADMIN — Medication 100 MILLIGRAM(S): at 05:29

## 2020-12-07 RX ADMIN — Medication 4: at 17:50

## 2020-12-07 RX ADMIN — Medication 25 MILLIGRAM(S): at 05:29

## 2020-12-07 RX ADMIN — Medication 100 MILLIGRAM(S): at 17:50

## 2020-12-07 RX ADMIN — Medication 2: at 09:19

## 2020-12-07 RX ADMIN — Medication 5 UNIT(S): at 17:50

## 2020-12-07 NOTE — PROGRESS NOTE ADULT - SUBJECTIVE AND OBJECTIVE BOX
PROGRESS NOTE:     CONTACT INFO:  Jose Curran MD  Internal Medicine PGY1  Pager: [phone]    Patient is a 60y old  Female who presents with a chief complaint of Urinary Rentention (06 Dec 2020 16:33)      SUBJECTIVE / OVERNIGHT EVENTS:  No acute events overnight. Patient seen and evaluated at bedside with  #[id number]. No fever/chills.  Denies SOB at rest, chest pain, palpitations, abdominal pain, nausea/vomiting. She currently has Salmeron in place. Pt would now like to try trial of void tonight instead of going home with catheter.    ADDITIONAL REVIEW OF SYSTEMS:    MEDICATIONS  (STANDING):  amLODIPine   Tablet 10 milliGRAM(s) Oral daily  aspirin  chewable 81 milliGRAM(s) Oral daily  dextrose 40% Gel 15 Gram(s) Oral once  dextrose 5%. 1000 milliLiter(s) (50 mL/Hr) IV Continuous <Continuous>  dextrose 5%. 1000 milliLiter(s) (100 mL/Hr) IV Continuous <Continuous>  dextrose 50% Injectable 25 Gram(s) IV Push once  dextrose 50% Injectable 12.5 Gram(s) IV Push once  dextrose 50% Injectable 25 Gram(s) IV Push once  glucagon  Injectable 1 milliGRAM(s) IntraMuscular once  influenza   Vaccine 0.5 milliLiter(s) IntraMuscular once  insulin glargine Injectable (LANTUS) 15 Unit(s) SubCutaneous at bedtime  insulin lispro (ADMELOG) corrective regimen sliding scale   SubCutaneous Before meals and at bedtime  insulin lispro Injectable (ADMELOG) 5 Unit(s) SubCutaneous three times a day before meals  melatonin 3 milliGRAM(s) Oral at bedtime  metoprolol tartrate 25 milliGRAM(s) Oral two times a day  nitrofurantoin monohydrate/macrocrystals (MACROBID) 100 milliGRAM(s) Oral two times a day    MEDICATIONS  (PRN):      CAPILLARY BLOOD GLUCOSE      POCT Blood Glucose.: 299 mg/dL (07 Dec 2020 12:21)  POCT Blood Glucose.: 225 mg/dL (07 Dec 2020 09:12)  POCT Blood Glucose.: 218 mg/dL (06 Dec 2020 22:07)  POCT Blood Glucose.: 325 mg/dL (06 Dec 2020 17:54)    I&O's Summary    07 Dec 2020 07:01  -  07 Dec 2020 13:59  --------------------------------------------------------  IN: 0 mL / OUT: 1400 mL / NET: -1400 mL        PHYSICAL EXAM:  Vital Signs Last 24 Hrs  T(C): 36.4 (07 Dec 2020 05:26), Max: 36.4 (06 Dec 2020 21:47)  T(F): 97.5 (07 Dec 2020 05:26), Max: 97.6 (06 Dec 2020 21:47)  HR: 79 (07 Dec 2020 05:26) (70 - 79)  BP: 142/77 (07 Dec 2020 05:26) (109/56 - 142/77)  BP(mean): --  RR: 18 (07 Dec 2020 05:26) (18 - 18)  SpO2: 98% (07 Dec 2020 05:26) (98% - 99%)    Constitutional: NAD, well-developed, well-nourished  Ears, Nose, Mouth, and Throat: normal external ears and nose, normal hearing, moist oral mucosa  Eyes: normal conjunctiva, EOMI, PERRL  Neck: supple, no JVD  Respiratory: Clear to auscultation bilaterally. No wheezes, rales or rhonchi. Normal respiratory effort  Cardiovascular: RRR, no M/R/G, no edema, 2+ Peripheral Pulses  Gastrointestinal: soft, nontender, nondistended, +BS, no hernia  : salmeron present with clear urine   Skin: warm, dry, no rash  Neurologic: sensation grossly intact, CN grossly intact, non-focal exam  Musculoskeletal: no clubbing, no cyanosis, no joint swelling  Psychiatric: AOX3, appropriate mood, affect    LABS:                        12.2   9.94  )-----------( 249      ( 07 Dec 2020 06:45 )             39.9     12-07    138  |  102  |  13  ----------------------------<  190<H>  3.9   |  25  |  0.65    Ca    9.6      07 Dec 2020 06:45  Phos  3.6     12-  Mg     1.7     12-    TPro  8.1  /  Alb  3.7  /  TBili  0.4  /  DBili  x   /  AST  25  /  ALT  22  /  AlkPhos  85  12-05          Urinalysis Basic - ( 05 Dec 2020 19:14 )    Color: Light Yellow / Appearance: Clear / S.010 / pH: x  Gluc: x / Ketone: Negative  / Bili: Negative / Urobili: <2 mg/dL   Blood: x / Protein: Negative / Nitrite: Negative   Leuk Esterase: Negative / RBC: x / WBC x   Sq Epi: x / Non Sq Epi: x / Bacteria: x          RADIOLOGY & ADDITIONAL TESTS:  Results Reviewed:   Imaging Personally Reviewed:  Electrocardiogram Personally Reviewed:    COORDINATION OF CARE:  Care Discussed with Consultants/Other Providers [Y/N]:  Prior or Outpatient Records Reviewed [Y/N]:

## 2020-12-07 NOTE — PROGRESS NOTE ADULT - PROBLEM SELECTOR PLAN 1
Likely anti-cholinergic effect from TCA, oxybutynin, in the setting of underlying DM  -s/p salmeron placement in the ED with drainage of >1.2L  -pt would now like to d/c Salmeron prior to discharge if possible. TOV evening of 12/7  -will hold amitriptyline and oxybutynin  -will find alternative for insomnia, will try melatonin here for now  -will hold any other medications with significant anti-cholinergic effects  -potentially related to diabetes, A1c 10.8  -has been an issue in the past for the patient, will follow up with PCP in the AM to see if patient has been seen by a specialist for this problem

## 2020-12-07 NOTE — PROGRESS NOTE ADULT - PROBLEM SELECTOR PLAN 3
although present with leukocytosis, UA negative  -patient has prescription for nitrofurantoin   -c/w nitrofurantoin

## 2020-12-07 NOTE — PROGRESS NOTE ADULT - ASSESSMENT
60 y.o F (French speaking, used  ID# [id number]) with PMH of type 2 DM (not on insulin), HTN, insomnia (on amitriptyline presents with difficulty urinating/polyuria x3 days found to have significant urinary retention

## 2020-12-08 LAB
ANION GAP SERPL CALC-SCNC: 11 MMOL/L — SIGNIFICANT CHANGE UP (ref 7–14)
BASOPHILS # BLD AUTO: 0.01 K/UL — SIGNIFICANT CHANGE UP (ref 0–0.2)
BASOPHILS NFR BLD AUTO: 0.1 % — SIGNIFICANT CHANGE UP (ref 0–2)
BUN SERPL-MCNC: 11 MG/DL — SIGNIFICANT CHANGE UP (ref 7–23)
CALCIUM SERPL-MCNC: 9.5 MG/DL — SIGNIFICANT CHANGE UP (ref 8.4–10.5)
CHLORIDE SERPL-SCNC: 100 MMOL/L — SIGNIFICANT CHANGE UP (ref 98–107)
CO2 SERPL-SCNC: 28 MMOL/L — SIGNIFICANT CHANGE UP (ref 22–31)
CREAT SERPL-MCNC: 0.61 MG/DL — SIGNIFICANT CHANGE UP (ref 0.5–1.3)
EOSINOPHIL # BLD AUTO: 0.19 K/UL — SIGNIFICANT CHANGE UP (ref 0–0.5)
EOSINOPHIL NFR BLD AUTO: 2.3 % — SIGNIFICANT CHANGE UP (ref 0–6)
GLUCOSE BLDC GLUCOMTR-MCNC: 185 MG/DL — HIGH (ref 70–99)
GLUCOSE BLDC GLUCOMTR-MCNC: 221 MG/DL — HIGH (ref 70–99)
GLUCOSE BLDC GLUCOMTR-MCNC: 279 MG/DL — HIGH (ref 70–99)
GLUCOSE BLDC GLUCOMTR-MCNC: 294 MG/DL — HIGH (ref 70–99)
GLUCOSE SERPL-MCNC: 207 MG/DL — HIGH (ref 70–99)
HCT VFR BLD CALC: 49.9 % — HIGH (ref 34.5–45)
HGB BLD-MCNC: 14.7 G/DL — SIGNIFICANT CHANGE UP (ref 11.5–15.5)
IANC: 4.88 K/UL — SIGNIFICANT CHANGE UP (ref 1.5–8.5)
IMM GRANULOCYTES NFR BLD AUTO: 0.4 % — SIGNIFICANT CHANGE UP (ref 0–1.5)
LYMPHOCYTES # BLD AUTO: 2.52 K/UL — SIGNIFICANT CHANGE UP (ref 1–3.3)
LYMPHOCYTES # BLD AUTO: 31 % — SIGNIFICANT CHANGE UP (ref 13–44)
MAGNESIUM SERPL-MCNC: 1.7 MG/DL — SIGNIFICANT CHANGE UP (ref 1.6–2.6)
MCHC RBC-ENTMCNC: 26.5 PG — LOW (ref 27–34)
MCHC RBC-ENTMCNC: 29.5 GM/DL — LOW (ref 32–36)
MCV RBC AUTO: 89.9 FL — SIGNIFICANT CHANGE UP (ref 80–100)
MONOCYTES # BLD AUTO: 0.5 K/UL — SIGNIFICANT CHANGE UP (ref 0–0.9)
MONOCYTES NFR BLD AUTO: 6.2 % — SIGNIFICANT CHANGE UP (ref 2–14)
NEUTROPHILS # BLD AUTO: 4.88 K/UL — SIGNIFICANT CHANGE UP (ref 1.8–7.4)
NEUTROPHILS NFR BLD AUTO: 60 % — SIGNIFICANT CHANGE UP (ref 43–77)
NRBC # BLD: 0 /100 WBCS — SIGNIFICANT CHANGE UP
NRBC # FLD: 0 K/UL — SIGNIFICANT CHANGE UP
PHOSPHATE SERPL-MCNC: 3.4 MG/DL — SIGNIFICANT CHANGE UP (ref 2.5–4.5)
PLATELET # BLD AUTO: 271 K/UL — SIGNIFICANT CHANGE UP (ref 150–400)
POTASSIUM SERPL-MCNC: 3.7 MMOL/L — SIGNIFICANT CHANGE UP (ref 3.5–5.3)
POTASSIUM SERPL-SCNC: 3.7 MMOL/L — SIGNIFICANT CHANGE UP (ref 3.5–5.3)
RBC # BLD: 5.55 M/UL — HIGH (ref 3.8–5.2)
RBC # FLD: 14.7 % — HIGH (ref 10.3–14.5)
SODIUM SERPL-SCNC: 139 MMOL/L — SIGNIFICANT CHANGE UP (ref 135–145)
WBC # BLD: 8.13 K/UL — SIGNIFICANT CHANGE UP (ref 3.8–10.5)
WBC # FLD AUTO: 8.13 K/UL — SIGNIFICANT CHANGE UP (ref 3.8–10.5)

## 2020-12-08 PROCEDURE — 99233 SBSQ HOSP IP/OBS HIGH 50: CPT

## 2020-12-08 RX ORDER — LANOLIN ALCOHOL/MO/W.PET/CERES
1 CREAM (GRAM) TOPICAL
Qty: 0 | Refills: 0 | DISCHARGE
Start: 2020-12-08

## 2020-12-08 RX ORDER — INSULIN LISPRO 100/ML
8 VIAL (ML) SUBCUTANEOUS
Qty: 1 | Refills: 3
Start: 2020-12-08

## 2020-12-08 RX ORDER — ENOXAPARIN SODIUM 100 MG/ML
18 INJECTION SUBCUTANEOUS
Qty: 3 | Refills: 3
Start: 2020-12-08

## 2020-12-08 RX ORDER — INSULIN GLARGINE 100 [IU]/ML
18 INJECTION, SOLUTION SUBCUTANEOUS AT BEDTIME
Refills: 0 | Status: DISCONTINUED | OUTPATIENT
Start: 2020-12-08 | End: 2020-12-09

## 2020-12-08 RX ORDER — INSULIN LISPRO 100/ML
7 VIAL (ML) SUBCUTANEOUS
Refills: 0 | Status: DISCONTINUED | OUTPATIENT
Start: 2020-12-08 | End: 2020-12-09

## 2020-12-08 RX ORDER — METFORMIN HYDROCHLORIDE 850 MG/1
1 TABLET ORAL
Qty: 0 | Refills: 0 | DISCHARGE

## 2020-12-08 RX ORDER — AMITRIPTYLINE HCL 25 MG
1 TABLET ORAL
Qty: 0 | Refills: 0 | DISCHARGE

## 2020-12-08 RX ORDER — OXYBUTYNIN CHLORIDE 5 MG
1 TABLET ORAL
Qty: 0 | Refills: 0 | DISCHARGE

## 2020-12-08 RX ORDER — INSULIN LISPRO 100/ML
8 VIAL (ML) SUBCUTANEOUS
Qty: 1 | Refills: 0
Start: 2020-12-08

## 2020-12-08 RX ORDER — SITAGLIPTIN 50 MG/1
1 TABLET, FILM COATED ORAL
Qty: 0 | Refills: 0 | DISCHARGE

## 2020-12-08 RX ORDER — ENOXAPARIN SODIUM 100 MG/ML
18 INJECTION SUBCUTANEOUS
Qty: 1 | Refills: 0
Start: 2020-12-08

## 2020-12-08 RX ORDER — ISOPROPYL ALCOHOL, BENZOCAINE .7; .06 ML/ML; ML/ML
1 SWAB TOPICAL
Qty: 100 | Refills: 1
Start: 2020-12-08 | End: 2021-01-26

## 2020-12-08 RX ADMIN — Medication 81 MILLIGRAM(S): at 13:16

## 2020-12-08 RX ADMIN — Medication 2: at 13:15

## 2020-12-08 RX ADMIN — Medication 7 UNIT(S): at 13:16

## 2020-12-08 RX ADMIN — INSULIN GLARGINE 18 UNIT(S): 100 INJECTION, SOLUTION SUBCUTANEOUS at 22:28

## 2020-12-08 RX ADMIN — Medication 25 MILLIGRAM(S): at 18:02

## 2020-12-08 RX ADMIN — Medication 1: at 09:16

## 2020-12-08 RX ADMIN — Medication 5 UNIT(S): at 09:18

## 2020-12-08 RX ADMIN — Medication 3 MILLIGRAM(S): at 22:28

## 2020-12-08 RX ADMIN — Medication 100 MILLIGRAM(S): at 18:02

## 2020-12-08 RX ADMIN — Medication 3: at 22:28

## 2020-12-08 RX ADMIN — Medication 100 MILLIGRAM(S): at 07:00

## 2020-12-08 RX ADMIN — AMLODIPINE BESYLATE 10 MILLIGRAM(S): 2.5 TABLET ORAL at 07:00

## 2020-12-08 RX ADMIN — Medication 25 MILLIGRAM(S): at 07:00

## 2020-12-08 RX ADMIN — Medication 3: at 18:01

## 2020-12-08 RX ADMIN — Medication 7 UNIT(S): at 18:01

## 2020-12-08 NOTE — PROGRESS NOTE ADULT - SUBJECTIVE AND OBJECTIVE BOX
PROGRESS NOTE:     CONTACT INFO:  Jose Curran MD  Internal Medicine PGY1  Pager: [phone]    Patient is a 60y old  Female who presents with a chief complaint of Urinary Rentention (07 Dec 2020 13:59)      SUBJECTIVE / OVERNIGHT EVENTS:  No acute events overnight. Patient seen and evaluated at bedside.     ADDITIONAL REVIEW OF SYSTEMS:    MEDICATIONS  (STANDING):  amLODIPine   Tablet 10 milliGRAM(s) Oral daily  aspirin  chewable 81 milliGRAM(s) Oral daily  dextrose 40% Gel 15 Gram(s) Oral once  dextrose 5%. 1000 milliLiter(s) (50 mL/Hr) IV Continuous <Continuous>  dextrose 5%. 1000 milliLiter(s) (100 mL/Hr) IV Continuous <Continuous>  dextrose 50% Injectable 25 Gram(s) IV Push once  dextrose 50% Injectable 12.5 Gram(s) IV Push once  dextrose 50% Injectable 25 Gram(s) IV Push once  glucagon  Injectable 1 milliGRAM(s) IntraMuscular once  influenza   Vaccine 0.5 milliLiter(s) IntraMuscular once  insulin glargine Injectable (LANTUS) 15 Unit(s) SubCutaneous at bedtime  insulin lispro (ADMELOG) corrective regimen sliding scale   SubCutaneous Before meals and at bedtime  insulin lispro Injectable (ADMELOG) 5 Unit(s) SubCutaneous three times a day before meals  melatonin 3 milliGRAM(s) Oral at bedtime  metoprolol tartrate 25 milliGRAM(s) Oral two times a day  nitrofurantoin monohydrate/macrocrystals (MACROBID) 100 milliGRAM(s) Oral two times a day    MEDICATIONS  (PRN):      CAPILLARY BLOOD GLUCOSE      POCT Blood Glucose.: 281 mg/dL (07 Dec 2020 22:15)  POCT Blood Glucose.: 341 mg/dL (07 Dec 2020 17:33)  POCT Blood Glucose.: 299 mg/dL (07 Dec 2020 12:21)  POCT Blood Glucose.: 225 mg/dL (07 Dec 2020 09:12)    I&O's Summary    07 Dec 2020 07:01  -  08 Dec 2020 07:00  --------------------------------------------------------  IN: 0 mL / OUT: 3650 mL / NET: -3650 mL        PHYSICAL EXAM:  Vital Signs Last 24 Hrs  T(C): 36.7 (08 Dec 2020 06:54), Max: 37 (07 Dec 2020 22:07)  T(F): 98 (08 Dec 2020 06:54), Max: 98.6 (07 Dec 2020 22:07)  HR: 95 (08 Dec 2020 06:54) (72 - 95)  BP: 131/76 (08 Dec 2020 06:54) (118/58 - 131/76)  BP(mean): --  RR: 18 (08 Dec 2020 06:54) (18 - 18)  SpO2: 95% (08 Dec 2020 06:54) (95% - 100%)    CONSTITUTIONAL: NAD, well-developed  RESPIRATORY: Normal respiratory effort; lungs are clear to auscultation bilaterally  CARDIOVASCULAR: Regular rate and rhythm, normal S1 and S2, no murmur/rub/gallop; No lower extremity edema; Peripheral pulses are 2+ bilaterally  ABDOMEN: Nontender to palpation, normoactive bowel sounds, no rebound/guarding; No hepatosplenomegaly  MUSCLOSKELETAL: no clubbing or cyanosis of digits; no joint swelling or tenderness to palpation  PSYCH: A+O to person, place, and time; affect appropriate    LABS:                        12.2   9.94  )-----------( 249      ( 07 Dec 2020 06:45 )             39.9     12-07    138  |  102  |  13  ----------------------------<  190<H>  3.9   |  25  |  0.65    Ca    9.6      07 Dec 2020 06:45  Phos  3.6     12-07  Mg     1.7     12-07                  RADIOLOGY & ADDITIONAL TESTS:  Results Reviewed:   Imaging Personally Reviewed:  Electrocardiogram Personally Reviewed:    COORDINATION OF CARE:  Care Discussed with Consultants/Other Providers [Y/N]:  Prior or Outpatient Records Reviewed [Y/N]:   PROGRESS NOTE:     CONTACT INFO:  Jose Curran MD  Internal Medicine PGY1  Pager: [phone]    Patient is a 60y old  Female who presents with a chief complaint of Urinary Rentention (07 Dec 2020 13:59)      SUBJECTIVE / OVERNIGHT EVENTS:  No acute events overnight. Patient seen and evaluated at bedside. Pt urinated 3 times overnight, successfully passed TOV. Pt has no sensation of incomplete bladder emptying. Says she has no issues urinating at this time. Will need diabetes education prior to discharge.    ADDITIONAL REVIEW OF SYSTEMS:    MEDICATIONS  (STANDING):  amLODIPine   Tablet 10 milliGRAM(s) Oral daily  aspirin  chewable 81 milliGRAM(s) Oral daily  dextrose 40% Gel 15 Gram(s) Oral once  dextrose 5%. 1000 milliLiter(s) (50 mL/Hr) IV Continuous <Continuous>  dextrose 5%. 1000 milliLiter(s) (100 mL/Hr) IV Continuous <Continuous>  dextrose 50% Injectable 25 Gram(s) IV Push once  dextrose 50% Injectable 12.5 Gram(s) IV Push once  dextrose 50% Injectable 25 Gram(s) IV Push once  glucagon  Injectable 1 milliGRAM(s) IntraMuscular once  influenza   Vaccine 0.5 milliLiter(s) IntraMuscular once  insulin glargine Injectable (LANTUS) 15 Unit(s) SubCutaneous at bedtime  insulin lispro (ADMELOG) corrective regimen sliding scale   SubCutaneous Before meals and at bedtime  insulin lispro Injectable (ADMELOG) 5 Unit(s) SubCutaneous three times a day before meals  melatonin 3 milliGRAM(s) Oral at bedtime  metoprolol tartrate 25 milliGRAM(s) Oral two times a day  nitrofurantoin monohydrate/macrocrystals (MACROBID) 100 milliGRAM(s) Oral two times a day    MEDICATIONS  (PRN):      CAPILLARY BLOOD GLUCOSE      POCT Blood Glucose.: 281 mg/dL (07 Dec 2020 22:15)  POCT Blood Glucose.: 341 mg/dL (07 Dec 2020 17:33)  POCT Blood Glucose.: 299 mg/dL (07 Dec 2020 12:21)  POCT Blood Glucose.: 225 mg/dL (07 Dec 2020 09:12)    I&O's Summary    07 Dec 2020 07:01  -  08 Dec 2020 07:00  --------------------------------------------------------  IN: 0 mL / OUT: 3650 mL / NET: -3650 mL        PHYSICAL EXAM:  Vital Signs Last 24 Hrs  T(C): 36.7 (08 Dec 2020 06:54), Max: 37 (07 Dec 2020 22:07)  T(F): 98 (08 Dec 2020 06:54), Max: 98.6 (07 Dec 2020 22:07)  HR: 95 (08 Dec 2020 06:54) (72 - 95)  BP: 131/76 (08 Dec 2020 06:54) (118/58 - 131/76)  BP(mean): --  RR: 18 (08 Dec 2020 06:54) (18 - 18)  SpO2: 95% (08 Dec 2020 06:54) (95% - 100%)    Constitutional: NAD, well-developed, well-nourished  Ears, Nose, Mouth, and Throat: normal external ears and nose, normal hearing, moist oral mucosa  Eyes: normal conjunctiva, EOMI, PERRL  Neck: supple, no JVD  Respiratory: Clear to auscultation bilaterally. No wheezes, rales or rhonchi. Normal respiratory effort  Cardiovascular: RRR, no M/R/G, no edema, 2+ Peripheral Pulses  Gastrointestinal: soft, nontender, nondistended, +BS, no hernia  : salmeron now removed  Skin: warm, dry, no rash  Neurologic: sensation grossly intact, CN grossly intact, non-focal exam  Musculoskeletal: no clubbing, no cyanosis, no joint swelling  Psychiatric: AOX3, appropriate mood, affect    LABS:                        12.2   9.94  )-----------( 249      ( 07 Dec 2020 06:45 )             39.9     12-07    138  |  102  |  13  ----------------------------<  190<H>  3.9   |  25  |  0.65    Ca    9.6      07 Dec 2020 06:45  Phos  3.6     12-07  Mg     1.7     12-07                  RADIOLOGY & ADDITIONAL TESTS:  Results Reviewed:   Imaging Personally Reviewed:  Electrocardiogram Personally Reviewed:    COORDINATION OF CARE:  Care Discussed with Consultants/Other Providers [Y/N]:  Prior or Outpatient Records Reviewed [Y/N]:

## 2020-12-08 NOTE — PROGRESS NOTE ADULT - PROBLEM SELECTOR PLAN 2
A1c 10.8  - Lantus 15 u, premeal 5 u however all mealtime and bedtime sugars remain elevated >200 A1c 10.8  - Increased Lantus to 18u and Lispro to 8u before meals for better control of sugars

## 2020-12-08 NOTE — PROGRESS NOTE ADULT - PROBLEM SELECTOR PLAN 5
ppx: improve score of 0  diet: CC/DASH/TLC  dispo: pending TOV ppx: improve score of 0  diet: CC/DASH/TLC  dispo: home after diabetes education

## 2020-12-08 NOTE — PROGRESS NOTE ADULT - PROBLEM SELECTOR PLAN 1
Likely anti-cholinergic effect from TCA, oxybutynin, in the setting of underlying DM  -s/p salmeron placement in the ED with drainage of >1.2L  -pt would now like to d/c Salmeron prior to discharge if possible. TOV evening of 12/7  -will hold amitriptyline and oxybutynin  -will find alternative for insomnia, will try melatonin here for now  -will hold any other medications with significant anti-cholinergic effects  -potentially related to diabetes, A1c 10.8  -has been an issue in the past for the patient, will follow up with PCP in the AM to see if patient has been seen by a specialist for this problem Likely anti-cholinergic effect from TCA, oxybutynin, in the setting of underlying DM  -s/p salmeron placement in the ED with drainage of >1.2L  -passed TOV 12/8  -will hold amitriptyline and oxybutynin  -will find alternative for insomnia, will try melatonin here for now  -will hold any other medications with significant anti-cholinergic effects  -potentially related to diabetes, A1c 10.8

## 2020-12-08 NOTE — PROGRESS NOTE ADULT - PROBLEM SELECTOR PLAN 4
-c/w amlodipine and metoprolol tartrate  -also on ASA, will need to follow up with PCP for indication -c/w amlodipine and metoprolol tartrate  -also on ASA, will need follow-up with PCP

## 2020-12-08 NOTE — PROGRESS NOTE ADULT - ASSESSMENT
60 y.o F (Welsh speaking, used  ID# [id number]) with PMH of type 2 DM (not on insulin), HTN, insomnia (on amitriptyline presents with difficulty urinating/polyuria x3 days found to have significant urinary retention. 60 y.o F (Vietnamese speaking, used  ID# [id number]) with PMH of type 2 DM (not on insulin), HTN, insomnia (on amitriptyline presents with difficulty urinating/polyuria x3 days found to have significant urinary retention with improvement with Mcdermott placement, now s/p Mcdermott and successfully passed trial of void.

## 2020-12-09 ENCOUNTER — TRANSCRIPTION ENCOUNTER (OUTPATIENT)
Age: 60
End: 2020-12-09

## 2020-12-09 VITALS
TEMPERATURE: 98 F | OXYGEN SATURATION: 98 % | RESPIRATION RATE: 18 BRPM | HEART RATE: 90 BPM | DIASTOLIC BLOOD PRESSURE: 87 MMHG | SYSTOLIC BLOOD PRESSURE: 125 MMHG

## 2020-12-09 LAB
CULTURE RESULTS: NO GROWTH — SIGNIFICANT CHANGE UP
GLUCOSE BLDC GLUCOMTR-MCNC: 142 MG/DL — HIGH (ref 70–99)
SPECIMEN SOURCE: SIGNIFICANT CHANGE UP

## 2020-12-09 PROCEDURE — 99239 HOSP IP/OBS DSCHRG MGMT >30: CPT

## 2020-12-09 RX ADMIN — AMLODIPINE BESYLATE 10 MILLIGRAM(S): 2.5 TABLET ORAL at 05:36

## 2020-12-09 RX ADMIN — Medication 100 MILLIGRAM(S): at 05:37

## 2020-12-09 RX ADMIN — Medication 7 UNIT(S): at 09:18

## 2020-12-09 RX ADMIN — Medication 25 MILLIGRAM(S): at 05:36

## 2020-12-09 NOTE — PROGRESS NOTE ADULT - ASSESSMENT
60 y.o F (Greek speaking, used  ID# [id number]) with PMH of type 2 DM (not on insulin), HTN, insomnia (on amitriptyline presents with difficulty urinating/polyuria x3 days found to have significant urinary retention with improvement with Mcdermott placement, now s/p Mcdermott and successfully passed trial of void.

## 2020-12-09 NOTE — PROGRESS NOTE ADULT - PROBLEM SELECTOR PLAN 1
Likely anti-cholinergic effect from TCA, oxybutynin, in the setting of underlying DM  -s/p salmeron placement in the ED with drainage of >1.2L  -passed TOV 12/8  -will hold amitriptyline and oxybutynin  -will find alternative for insomnia, will try melatonin here for now  -will hold any other medications with significant anti-cholinergic effects  -potentially related to diabetes, A1c 10.8

## 2020-12-09 NOTE — PHARMACOTHERAPY INTERVENTION NOTE - COMMENTS
Mckenzie # [id number] (Esequiel). Pt &  educated on A1c (what that means, goal A1c), basal/bolus insulin pen administration, hypoglycemia and treatment, healthy plate, sources of carbohydrates, goal BG, and when to check BG, pt struggled with return pen demonstration and needed guidance. Went over the difference between basal and bolus insulin, the need to start eating within 15 mins of taking pre-meal insulin. Originally, pt did not know to do 2 unit test dose, could not turn to the correct dose (did not have glasses), did not hold pen for 10 sec, and bent the pen needle. After practicing again, she was able to properly demonstrate. Counseled pt on where to inject insulin (abdomen, outer thighs), rotating injection site to prevent lipodystrophy), proper insulin storage, and sharps disposal. Pt did not have a nutrition consult - did basics of what is a carbohydrate, healthy plate, avoiding juices/sodas, fruits with low or high amt of carbs - pt would greatly benefit from a culturally-sensitive Registered Dietician. Pt encouraged for outpt f/u with medicine and endocrine - no endocrinologist - provided phone # for [facility]. Pt's  kept asking when she can stop insulin - counseled him that she needs to consistently take the insulin (do not stop even if BG is good) and their Dr can check her A1c in about 3 months - recommended an endocrinologist to follow her to assist with managing BG. Mckenzie # [id number] (Esequiel). Pt &  educated on A1c (what that means, goal A1c), basal/bolus insulin pen administration, hypoglycemia and treatment, healthy plate, sources of carbohydrates, goal BG, and when to check BG, pt struggled with return pen demonstration and needed guidance. Went over the difference between basal and bolus insulin, the need to start eating within 15 mins of taking pre-meal insulin. Originally, pt did not know to do 2 unit test dose, could not turn to the correct dose (did not have glasses), did not hold pen for 10 sec, and bent the pen needle. After practicing again, she was able to properly demonstrate. Counseled pt on where to inject insulin (abdomen, outer thighs), rotating injection site to prevent lipodystrophy), proper insulin storage, and sharps disposal. Pt did not have a nutrition consult - did basics of what is a carbohydrate, healthy plate, avoiding juices/sodas, fruits with low or high amt of carbs - pt would greatly benefit from a culturally-sensitive Registered Dietician. Pt encouraged for outpt f/u with medicine and endocrine - no endocrinologist - provided phone # for [facility]. Pt's  kept asking when she can stop insulin - counseled him that she needs to consistently take the insulin (do not stop even if BG is good) and their Dr can check her A1c in about 3 months - recommended an endocrinologist to follow her to assist with managing BG.    Time spent: 1 hour

## 2020-12-09 NOTE — PROGRESS NOTE ADULT - SUBJECTIVE AND OBJECTIVE BOX
PROGRESS NOTE:     CONTACT INFO:  Jose Curran MD  Internal Medicine PGY1  Pager: [phone]    Patient is a 60y old  Female who presents with a chief complaint of Urinary Rentention (08 Dec 2020 07:57)      SUBJECTIVE / OVERNIGHT EVENTS:  No acute events overnight. Patient seen and evaluated at bedside. No fever/chills.  Denies SOB at rest, chest pain, palpitations, abdominal pain, nausea/vomiting    ADDITIONAL REVIEW OF SYSTEMS:    MEDICATIONS  (STANDING):  amLODIPine   Tablet 10 milliGRAM(s) Oral daily  aspirin  chewable 81 milliGRAM(s) Oral daily  dextrose 40% Gel 15 Gram(s) Oral once  dextrose 5%. 1000 milliLiter(s) (50 mL/Hr) IV Continuous <Continuous>  dextrose 5%. 1000 milliLiter(s) (100 mL/Hr) IV Continuous <Continuous>  dextrose 50% Injectable 25 Gram(s) IV Push once  dextrose 50% Injectable 12.5 Gram(s) IV Push once  dextrose 50% Injectable 25 Gram(s) IV Push once  glucagon  Injectable 1 milliGRAM(s) IntraMuscular once  influenza   Vaccine 0.5 milliLiter(s) IntraMuscular once  insulin glargine Injectable (LANTUS) 18 Unit(s) SubCutaneous at bedtime  insulin lispro (ADMELOG) corrective regimen sliding scale   SubCutaneous Before meals and at bedtime  insulin lispro Injectable (ADMELOG) 7 Unit(s) SubCutaneous three times a day before meals  melatonin 3 milliGRAM(s) Oral at bedtime  metoprolol tartrate 25 milliGRAM(s) Oral two times a day  nitrofurantoin monohydrate/macrocrystals (MACROBID) 100 milliGRAM(s) Oral two times a day    MEDICATIONS  (PRN):      CAPILLARY BLOOD GLUCOSE      POCT Blood Glucose.: 294 mg/dL (08 Dec 2020 22:11)  POCT Blood Glucose.: 279 mg/dL (08 Dec 2020 17:47)  POCT Blood Glucose.: 221 mg/dL (08 Dec 2020 12:30)  POCT Blood Glucose.: 185 mg/dL (08 Dec 2020 08:44)    I&O's Summary      PHYSICAL EXAM:  Vital Signs Last 24 Hrs  T(C): 36.6 (09 Dec 2020 05:35), Max: 36.9 (08 Dec 2020 22:45)  T(F): 97.8 (09 Dec 2020 05:35), Max: 98.4 (08 Dec 2020 22:45)  HR: 90 (09 Dec 2020 05:35) (74 - 90)  BP: 125/87 (09 Dec 2020 05:35) (111/59 - 125/87)  BP(mean): --  RR: 18 (09 Dec 2020 05:35) (18 - 18)  SpO2: 98% (09 Dec 2020 05:35) (98% - 100%)    CONSTITUTIONAL: NAD, well-developed  RESPIRATORY: Normal respiratory effort; lungs are clear to auscultation bilaterally  CARDIOVASCULAR: Regular rate and rhythm, normal S1 and S2, no murmur/rub/gallop; No lower extremity edema; Peripheral pulses are 2+ bilaterally  ABDOMEN: Nontender to palpation, normoactive bowel sounds, no rebound/guarding; No hepatosplenomegaly  MUSCLOSKELETAL: no clubbing or cyanosis of digits; no joint swelling or tenderness to palpation  PSYCH: A+O to person, place, and time; affect appropriate    LABS:                        14.7   8.13  )-----------( 271      ( 08 Dec 2020 08:26 )             49.9     12-08    139  |  100  |  11  ----------------------------<  207<H>  3.7   |  28  |  0.61    Ca    9.5      08 Dec 2020 08:26  Phos  3.4     12-08  Mg     1.7     12-08                  RADIOLOGY & ADDITIONAL TESTS:  Results Reviewed:   Imaging Personally Reviewed:  Electrocardiogram Personally Reviewed:    COORDINATION OF CARE:  Care Discussed with Consultants/Other Providers [Y/N]:  Prior or Outpatient Records Reviewed [Y/N]:

## 2021-07-18 ENCOUNTER — EMERGENCY (EMERGENCY)
Facility: HOSPITAL | Age: 61
LOS: 1 days | Discharge: HOME | End: 2021-07-18
Attending: EMERGENCY MEDICINE | Admitting: EMERGENCY MEDICINE
Payer: MEDICAID

## 2021-07-18 VITALS
HEART RATE: 91 BPM | RESPIRATION RATE: 16 BRPM | SYSTOLIC BLOOD PRESSURE: 175 MMHG | HEIGHT: 60 IN | TEMPERATURE: 97 F | DIASTOLIC BLOOD PRESSURE: 77 MMHG | OXYGEN SATURATION: 97 %

## 2021-07-18 VITALS
SYSTOLIC BLOOD PRESSURE: 152 MMHG | HEART RATE: 82 BPM | TEMPERATURE: 99 F | DIASTOLIC BLOOD PRESSURE: 73 MMHG | OXYGEN SATURATION: 99 % | RESPIRATION RATE: 18 BRPM

## 2021-07-18 PROBLEM — E78.5 HYPERLIPIDEMIA, UNSPECIFIED: Chronic | Status: ACTIVE | Noted: 2020-12-07

## 2021-07-18 PROBLEM — I10 ESSENTIAL (PRIMARY) HYPERTENSION: Chronic | Status: ACTIVE | Noted: 2020-12-07

## 2021-07-18 LAB
ANION GAP SERPL CALC-SCNC: 20 MMOL/L — HIGH (ref 7–14)
APPEARANCE UR: CLEAR — SIGNIFICANT CHANGE UP
BACTERIA # UR AUTO: ABNORMAL
BASOPHILS # BLD AUTO: 0.04 K/UL — SIGNIFICANT CHANGE UP (ref 0–0.2)
BASOPHILS NFR BLD AUTO: 0.2 % — SIGNIFICANT CHANGE UP (ref 0–2)
BILIRUB UR-MCNC: NEGATIVE — SIGNIFICANT CHANGE UP
BUN SERPL-MCNC: 17 MG/DL — SIGNIFICANT CHANGE UP (ref 7–23)
CALCIUM SERPL-MCNC: 9.5 MG/DL — SIGNIFICANT CHANGE UP (ref 8.4–10.5)
CHLORIDE SERPL-SCNC: 93 MMOL/L — LOW (ref 98–107)
CO2 SERPL-SCNC: 19 MMOL/L — LOW (ref 22–31)
COLOR SPEC: SIGNIFICANT CHANGE UP
CREAT SERPL-MCNC: 0.85 MG/DL — SIGNIFICANT CHANGE UP (ref 0.5–1.3)
DIFF PNL FLD: ABNORMAL
EOSINOPHIL # BLD AUTO: 0.05 K/UL — SIGNIFICANT CHANGE UP (ref 0–0.5)
EOSINOPHIL NFR BLD AUTO: 0.3 % — SIGNIFICANT CHANGE UP (ref 0–6)
EPI CELLS # UR: 3 /HPF — SIGNIFICANT CHANGE UP (ref 0–5)
GLUCOSE SERPL-MCNC: 279 MG/DL — HIGH (ref 70–99)
GLUCOSE UR QL: ABNORMAL
HCT VFR BLD CALC: 46.5 % — HIGH (ref 34.5–45)
HGB BLD-MCNC: 13.8 G/DL — SIGNIFICANT CHANGE UP (ref 11.5–15.5)
HYALINE CASTS # UR AUTO: 1 /LPF — SIGNIFICANT CHANGE UP (ref 0–7)
IANC: 15.47 K/UL — HIGH (ref 1.5–8.5)
IMM GRANULOCYTES NFR BLD AUTO: 0.5 % — SIGNIFICANT CHANGE UP (ref 0–1.5)
KETONES UR-MCNC: ABNORMAL
LEUKOCYTE ESTERASE UR-ACNC: ABNORMAL
LYMPHOCYTES # BLD AUTO: 11.8 % — LOW (ref 13–44)
LYMPHOCYTES # BLD AUTO: 2.2 K/UL — SIGNIFICANT CHANGE UP (ref 1–3.3)
MAGNESIUM SERPL-MCNC: 1.7 MG/DL — SIGNIFICANT CHANGE UP (ref 1.6–2.6)
MCHC RBC-ENTMCNC: 25.5 PG — LOW (ref 27–34)
MCHC RBC-ENTMCNC: 29.7 GM/DL — LOW (ref 32–36)
MCV RBC AUTO: 85.8 FL — SIGNIFICANT CHANGE UP (ref 80–100)
MONOCYTES # BLD AUTO: 0.75 K/UL — SIGNIFICANT CHANGE UP (ref 0–0.9)
MONOCYTES NFR BLD AUTO: 4 % — SIGNIFICANT CHANGE UP (ref 2–14)
NEUTROPHILS # BLD AUTO: 15.47 K/UL — HIGH (ref 1.8–7.4)
NEUTROPHILS NFR BLD AUTO: 83.2 % — HIGH (ref 43–77)
NITRITE UR-MCNC: NEGATIVE — SIGNIFICANT CHANGE UP
NRBC # BLD: 0 /100 WBCS — SIGNIFICANT CHANGE UP
NRBC # FLD: 0 K/UL — SIGNIFICANT CHANGE UP
PH UR: 7 — SIGNIFICANT CHANGE UP (ref 5–8)
PHOSPHATE SERPL-MCNC: 3.7 MG/DL — SIGNIFICANT CHANGE UP (ref 2.5–4.5)
PLATELET # BLD AUTO: 329 K/UL — SIGNIFICANT CHANGE UP (ref 150–400)
POTASSIUM SERPL-MCNC: 3.7 MMOL/L — SIGNIFICANT CHANGE UP (ref 3.5–5.3)
POTASSIUM SERPL-SCNC: 3.7 MMOL/L — SIGNIFICANT CHANGE UP (ref 3.5–5.3)
PROT UR-MCNC: ABNORMAL
RBC # BLD: 5.42 M/UL — HIGH (ref 3.8–5.2)
RBC # FLD: 15.3 % — HIGH (ref 10.3–14.5)
RBC CASTS # UR COMP ASSIST: SIGNIFICANT CHANGE UP /HPF (ref 0–4)
SODIUM SERPL-SCNC: 132 MMOL/L — LOW (ref 135–145)
SP GR SPEC: 1.01 — SIGNIFICANT CHANGE UP (ref 1.01–1.02)
UROBILINOGEN FLD QL: SIGNIFICANT CHANGE UP
WBC # BLD: 18.6 K/UL — HIGH (ref 3.8–10.5)
WBC # FLD AUTO: 18.6 K/UL — HIGH (ref 3.8–10.5)
WBC UR QL: SIGNIFICANT CHANGE UP /HPF (ref 0–5)

## 2021-07-18 PROCEDURE — 99284 EMERGENCY DEPT VISIT MOD MDM: CPT | Mod: 25

## 2021-07-18 RX ORDER — ACETAMINOPHEN 500 MG
650 TABLET ORAL ONCE
Refills: 0 | Status: COMPLETED | OUTPATIENT
Start: 2021-07-18 | End: 2021-07-18

## 2021-07-18 RX ORDER — CEFPODOXIME PROXETIL 100 MG
1 TABLET ORAL
Qty: 20 | Refills: 0
Start: 2021-07-18 | End: 2021-07-27

## 2021-07-18 RX ORDER — CEFPODOXIME PROXETIL 100 MG
200 TABLET ORAL ONCE
Refills: 0 | Status: COMPLETED | OUTPATIENT
Start: 2021-07-18 | End: 2021-07-18

## 2021-07-18 RX ADMIN — Medication 200 MILLIGRAM(S): at 07:11

## 2021-07-18 RX ADMIN — Medication 650 MILLIGRAM(S): at 03:26

## 2021-07-18 NOTE — ED ADULT NURSE REASSESSMENT NOTE - NS ED NURSE REASSESS COMMENT FT1
Pt d/c by MD, escorted to exit with WC and ,  salmeron connected to legbag, PT education done with teach back in own words.

## 2021-07-18 NOTE — ED PROVIDER NOTE - NS ED ROS FT
CONSTITUTIONAL - No fever, No diaphoresis, No weight change  SKIN - No rash  HEMATOLOGIC - No abnormal bleeding or bruising  EYES - No eye pain, No blurred vision  ENT - No change in hearing, No sore throat, No neck pain, No rhinorrhea, No ear pain  RESPIRATORY - No shortness of breath, No cough  CARDIAC -No chest pain, No palpitations  GI +abdominal pain, No nausea, No vomiting, No diarrhea, No constipation  - No dysuria  MUSCULOSKELETAL - No joint pain, No swelling, No back pain  NEUROLOGIC - No numbness, No focal weakness, No headache, No dizziness

## 2021-07-18 NOTE — ED ADULT NURSE NOTE - NSIMPLEMENTINTERV_GEN_ALL_ED
Implemented All Universal Safety Interventions:  [place] to call system. Call bell, personal items and telephone within reach. Instruct patient to call for assistance. Room bathroom lighting operational. Non-slip footwear when patient is off stretcher. Physically safe environment: no spills, clutter or unnecessary equipment. Stretcher in lowest position, wheels locked, appropriate side rails in place.

## 2021-07-18 NOTE — ED ADULT TRIAGE NOTE - CHIEF COMPLAINT QUOTE
alert oriented Uzbek speaking  will translate  c/o urinary retention appears very uncomfortable  no urine since  1700  has had a catheter before hx     was here in december with same complaint hx DM HTN high cholesterol

## 2021-07-18 NOTE — ED ADULT NURSE NOTE - OBJECTIVE STATEMENT
Received PT to room 18, AAO4, ambulatory at baseline, Kazakh speaking,  used at bedside with MD, c/o lower abdominal discomfort and urinary retention. Pmhx: DM, HTN, HLD p/w urinary retention. PT states she has been unable to urinate since 5pm yesterday. Presents with abdominal distention. Denies chest pain, SOB, headache, dizziness. Left wrist 20g placed, labs drawn. Salmeron attempted x2. Unable to insert salmeron, blood noted with restriction. Urology contacted. 16fr salmeron placed, clear yellow urine output noted. Bed in lower position, accompanied by .

## 2021-07-18 NOTE — ED PROVIDER NOTE - PATIENT PORTAL LINK FT
You can access the FollowMyHealth Patient Portal offered by [facility] by registering at the following website: [email or web address]. By joining [employer]’s FollowMyHealth portal, you will also be able to view your health information using other applications (apps) compatible with our system.

## 2021-07-18 NOTE — ED PROVIDER NOTE - PHYSICAL EXAMINATION
CONSTITUTIONAL: Well-developed; well-nourished; in no acute distress.   SKIN: warm, dry  HEAD: Normocephalic; atraumatic.  EYES: no conjunctival injection. PERRL.   ENT: No nasal discharge; airway clear.  NECK: Supple; non tender.  CARD: S1, S2 normal; no murmurs, gallops, or rubs. Regular rate and rhythm.   RESP: No wheezes, rales or rhonchi. Good air movement bilaterally.   ABD: soft nt but distented, no guarding, no distention, no rigidity.   EXT: Ambulates independently.  No cyanosis or edema.   LYMPH: No acute cervical adenopathy.  NEURO: Alert, oriented, grossly unremarkable  PSYCH: Cooperative, appropriate. CONSTITUTIONAL: Well-developed; well-nourished; in no acute distress.   SKIN: warm, dry  HEAD: Normocephalic; atraumatic.  EYES: no conjunctival injection. PERRL.   ENT: No nasal discharge; airway clear.  NECK: Supple; non tender.  CARD: S1, S2 normal; no murmurs, gallops, or rubs. Regular rate and rhythm.   RESP: No wheezes, rales or rhonchi. Good air movement bilaterally.   ABD: soft nt but distented, no guarding,no rigidity.   EXT: Ambulates independently.  No cyanosis or edema.   NEURO: Alert, oriented, grossly unremarkable  PSYCH: Cooperative, appropriate.

## 2021-07-18 NOTE — PROCEDURE NOTE - ADDITIONAL PROCEDURE DETAILS
Patient should keep salmeron in place. Follow up outpatient for ENRIQUETA     [facility]  [address] [place] [address] [phone]

## 2021-07-18 NOTE — ED PROVIDER NOTE - ATTENDING CONTRIBUTION TO CARE
60yo F PMHX DM, HTN, HLD, prior episodes of urinary retention requiring salmeron, p/w scant urine output since lasst night at 1700, now with lower abdominal fullness and discomfort.  No fevers or n/v or other complaints    General: Patient appears uncomfortable, AAO x 3  Skin: Dry and intact  HEENT: Head atraumatic. Oral mucosa moist.   Eyes: Conjunctiva normal  Cardiac: Regular rhythm and rate. No pretibial edema b/l  Respiratory: Lungs clear b/l and symmetric. No respiratory distress. Able to speak in complete sentences.  Gastrointestinal: Abdomen soft, +distended lower abd, nontender  Musculoskeletal: Moves all extremities spontaneously  Neurological: alert and oriented to person, place, and time  Psychiatric: Cooperative    a/p  acute urine retention  salmeron placed with improved symptoms and large output  labs with BMP to check creatinine  assuming no acute abnormalities on labs, plan to d/ home with leg bag and uro f/u

## 2021-07-18 NOTE — ED PROVIDER NOTE - OBJECTIVE STATEMENT
60 y/o F w/ hx of DM, HTN, HLD p/w urinary retention. States she has been unable to urinate since 5pm on 7/17. Endorses chills. Has been here previously for urinary retention. 62 y/o F w/ hx of DM, HTN, HLD p/w urinary retention. States she has been unable to urinate since 5pm on 7/17. Endorses chills. Has been here previously for urinary retention. States that in the days prior the pt had burning w/ urination. Had salmeron here in December for urinary retention as well. Denies n/v, cp, sob, diarrhea.

## 2021-07-18 NOTE — ED PROVIDER NOTE - NSFOLLOWUPCLINICS_GEN_ALL_ED_FT
[facility] - Urology  Urology  [address], [address]th [place] [address]  Phone: [phone]  Fax:   Follow Up Time: 1-3 Days

## 2021-07-18 NOTE — ED PROVIDER NOTE - PROGRESS NOTE DETAILS
O'Tunde DO PGY-2: POCUS bladder scan showed around 750cc O'Tunde DO PGY-2: called uro for assistacne w/ salmeron palcement O'Morrill DO PGY-2: called uro for assistance w/ salmeron placement as ED RN and staff unable to pass salmeron as there is resistance to advancement.

## 2021-07-18 NOTE — ED PROVIDER NOTE - CARE PLAN
Principal Discharge DX:	UTI (urinary tract infection)   Principal Discharge DX:	UTI (urinary tract infection)  Secondary Diagnosis:	Acute urinary retention

## 2021-07-18 NOTE — ED PROVIDER NOTE - CLINICAL SUMMARY MEDICAL DECISION MAKING FREE TEXT BOX
O'Tunde DO PGY-2: pt p/w urinary retention. POCUS showed around 750cc. Will place salmeron, will check labs and ua. Dispo pending workup

## 2021-07-18 NOTE — ED PROVIDER NOTE - NSFOLLOWUPINSTRUCTIONS_ED_ALL_ED_FT
(1) Follow up with your primary care physician as discussed. Listed below are the specialists that will be necessary to see as an outpatient to continue the workup.  Please call the numbers listed below or [phone] to set up the necessary appointments.  (2) Immediately seek care at your nearest emergency room if your symptoms worsen, persist, or do not resolve   (3) Take Tylenol and/or Motrin as needed for pain per the dosing instructions on the bottle.   (4) Take the prescribed medication as instructed:      Urinary Tract Infection in Women    WHAT YOU NEED TO KNOW:    A urinary tract infection (UTI) is caused by bacteria that get inside your urinary tract. Most bacteria that enter your urinary tract come out when you urinate. If the bacteria stay in your urinary tract, you may get an infection. Your urinary tract includes your kidneys, ureters, bladder, and urethra. Urine is made in your kidneys, and it flows from the ureters to the bladder. Urine leaves the bladder through the urethra. A UTI is more common in your lower urinary tract, which includes your bladder and urethra.     DISCHARGE INSTRUCTIONS:    Return to the emergency department if:   •You are urinating very little or not at all.  •You have a high fever with shaking chills.   •You have side or back pain that gets worse.    Call your doctor if:   •You have a fever.  •You do not feel better after 2 days of taking antibiotics.  •You are vomiting.   •You have questions or concerns about your condition or care.

## 2021-07-19 RX ORDER — CEFUROXIME AXETIL 250 MG
1 TABLET ORAL
Qty: 20 | Refills: 0
Start: 2021-07-19 | End: 2021-07-28

## 2021-07-19 NOTE — ED POST DISCHARGE NOTE - RESULT SUMMARY
ELVIE Villalpando: Received a call from pt's son, states Vantin is not covered, will substitute with Cefuroxime.

## 2021-07-20 LAB
CULTURE RESULTS: SIGNIFICANT CHANGE UP
SPECIMEN SOURCE: SIGNIFICANT CHANGE UP

## 2021-07-23 ENCOUNTER — APPOINTMENT (OUTPATIENT)
Dept: UROLOGY | Facility: CLINIC | Age: 61
End: 2021-07-23
Payer: MEDICAID

## 2021-07-23 VITALS
SYSTOLIC BLOOD PRESSURE: 178 MMHG | TEMPERATURE: 97.3 F | HEART RATE: 95 BPM | HEIGHT: 63 IN | WEIGHT: 160 LBS | BODY MASS INDEX: 28.35 KG/M2 | DIASTOLIC BLOOD PRESSURE: 102 MMHG

## 2021-07-23 PROCEDURE — 99204 OFFICE O/P NEW MOD 45 MIN: CPT

## 2021-07-26 ENCOUNTER — APPOINTMENT (OUTPATIENT)
Dept: UROLOGY | Facility: HOSPITAL | Age: 61
End: 2021-07-26

## 2021-08-06 ENCOUNTER — APPOINTMENT (OUTPATIENT)
Dept: UROLOGY | Facility: CLINIC | Age: 61
End: 2021-08-06
Payer: MEDICAID

## 2021-08-06 DIAGNOSIS — R33.9 RETENTION OF URINE, UNSPECIFIED: ICD-10-CM

## 2021-08-06 PROCEDURE — 99213 OFFICE O/P EST LOW 20 MIN: CPT

## 2021-08-09 NOTE — HISTORY OF PRESENT ILLNESS
[FreeTextEntry1] : \par - Chief Complaint: The patient is a 61y Female complaining of urinary retention\par - HPI Objective Statement: 60 y/o F w/ hx of DM, HTN, HLD p/w urinary\par retention. States she has been unable to urinate since 5pm on 7/17. Endorses\par chills. Has been here previously for urinary retention. States that in the days\par prior the pt had burning w/ urination. Had salmeron here in December for urinary\par retention as well. Denies n/v, cp, sob, diarrhea.

## 2021-08-09 NOTE — REVIEW OF SYSTEMS
[both] : pain during and after intercourse [denies] : denies pain with orgasm [Urine Infection (bladder/kidney)] : bladder/kidney infection [Negative] : Heme/Lymph [FreeTextEntry6] : was in the hospital for a uti , got a catheter placed on 07/18/21

## 2021-08-13 LAB
APPEARANCE: CLEAR
BACTERIA UR CULT: ABNORMAL
BACTERIA: ABNORMAL
BILIRUBIN URINE: NEGATIVE
BLOOD URINE: NEGATIVE
COLOR: NORMAL
GLUCOSE QUALITATIVE U: ABNORMAL
HYALINE CASTS: 1 /LPF
KETONES URINE: NORMAL
LEUKOCYTE ESTERASE URINE: ABNORMAL
MICROSCOPIC-UA: NORMAL
NITRITE URINE: NEGATIVE
PH URINE: 6.5
PROTEIN URINE: NORMAL
RED BLOOD CELLS URINE: 1 /HPF
SPECIFIC GRAVITY URINE: 1.01
SQUAMOUS EPITHELIAL CELLS: 1 /HPF
UROBILINOGEN URINE: NORMAL
WHITE BLOOD CELLS URINE: 47 /HPF

## 2021-08-13 RX ORDER — LEVOFLOXACIN 500 MG/1
500 TABLET, FILM COATED ORAL DAILY
Qty: 7 | Refills: 0 | Status: ACTIVE | COMMUNITY
Start: 2021-08-13 | End: 1900-01-01

## 2021-08-16 ENCOUNTER — NON-APPOINTMENT (OUTPATIENT)
Age: 61
End: 2021-08-16

## 2021-09-15 PROBLEM — R33.9 URINARY RETENTION: Status: ACTIVE | Noted: 2021-08-09

## 2021-09-15 NOTE — HISTORY OF PRESENT ILLNESS
[FreeTextEntry1] : \par - Chief Complaint: The patient is a 61y Female complaining of urinary retention\par - HPI Objective Statement: 62 y/o F w/ hx of DM, HTN, HLD p/w urinary\par retention. States she has been unable to urinate since 5pm on 7/17. Endorses\par chills. Has been here previously for urinary retention. States that in the days\par prior the pt had burning w/ urination. Had salmeron here in December for urinary\par retention as well. Denies n/v, cp, sob, diarrhea.\par \par salmeron removed last visit vodiing well\par has some discomfort

## 2022-11-14 ENCOUNTER — APPOINTMENT (OUTPATIENT)
Dept: ORTHOPEDIC SURGERY | Facility: CLINIC | Age: 62
End: 2022-11-14

## 2023-02-25 NOTE — DISCHARGE NOTE NURSING/CASE MANAGEMENT/SOCIAL WORK - PATIENT PORTAL LINK FT
50 50 50 You can access the FollowMyHealth Patient Portal offered by [facility] by registering at the following website: [email or web address]. By joining [employer]’s FollowMyHealth portal, you will also be able to view your health information using other applications (apps) compatible with our system.